# Patient Record
Sex: MALE | Race: BLACK OR AFRICAN AMERICAN | NOT HISPANIC OR LATINO | RURAL
[De-identification: names, ages, dates, MRNs, and addresses within clinical notes are randomized per-mention and may not be internally consistent; named-entity substitution may affect disease eponyms.]

---

## 2019-04-04 ENCOUNTER — HISTORICAL (OUTPATIENT)
Dept: ADMINISTRATIVE | Facility: HOSPITAL | Age: 79
End: 2019-04-04

## 2019-04-09 LAB
LAB AP CLINICAL INFORMATION: NORMAL
LAB AP COMMENTS: NORMAL
LAB AP DIAGNOSIS - HISTORICAL: NORMAL
LAB AP GROSS PATHOLOGY - HISTORICAL: NORMAL
LAB AP SPECIMEN SUBMITTED - HISTORICAL: NORMAL

## 2021-07-12 DIAGNOSIS — I10 ESSENTIAL HYPERTENSION: Primary | ICD-10-CM

## 2021-07-12 DIAGNOSIS — K21.9 GASTROESOPHAGEAL REFLUX DISEASE, UNSPECIFIED WHETHER ESOPHAGITIS PRESENT: ICD-10-CM

## 2021-07-12 RX ORDER — LOSARTAN POTASSIUM 50 MG/1
50 TABLET ORAL DAILY
Qty: 30 TABLET | Refills: 3 | Status: SHIPPED | OUTPATIENT
Start: 2021-07-12 | End: 2022-07-28

## 2021-07-12 RX ORDER — METOPROLOL SUCCINATE 100 MG/1
100 TABLET, EXTENDED RELEASE ORAL DAILY
Qty: 30 TABLET | Refills: 3 | Status: SHIPPED | OUTPATIENT
Start: 2021-07-12 | End: 2022-11-21 | Stop reason: SDUPTHER

## 2021-07-12 RX ORDER — METOPROLOL SUCCINATE 100 MG/1
TABLET, EXTENDED RELEASE ORAL
COMMUNITY
Start: 2021-05-14 | End: 2021-07-12 | Stop reason: SDUPTHER

## 2021-07-12 RX ORDER — GABAPENTIN 100 MG/1
100 CAPSULE ORAL 2 TIMES DAILY
COMMUNITY
Start: 2021-05-14 | End: 2022-07-28 | Stop reason: SDUPTHER

## 2021-07-12 RX ORDER — LOSARTAN POTASSIUM 50 MG/1
TABLET ORAL
COMMUNITY
Start: 2021-04-29 | End: 2021-07-12 | Stop reason: SDUPTHER

## 2021-07-12 RX ORDER — NIFEDIPINE 90 MG/1
TABLET, EXTENDED RELEASE ORAL
COMMUNITY
Start: 2021-05-14 | End: 2021-07-12 | Stop reason: SDUPTHER

## 2021-07-12 RX ORDER — NIFEDIPINE 90 MG/1
90 TABLET, EXTENDED RELEASE ORAL DAILY
Qty: 30 TABLET | Refills: 3 | Status: SHIPPED | OUTPATIENT
Start: 2021-07-12 | End: 2022-07-28

## 2021-07-12 RX ORDER — FAMOTIDINE 40 MG/1
TABLET, FILM COATED ORAL
COMMUNITY
Start: 2021-07-09 | End: 2021-07-12 | Stop reason: SDUPTHER

## 2021-07-12 RX ORDER — FAMOTIDINE 40 MG/1
40 TABLET, FILM COATED ORAL DAILY
Qty: 30 TABLET | Refills: 3 | Status: SHIPPED | OUTPATIENT
Start: 2021-07-12 | End: 2021-08-16 | Stop reason: SDUPTHER

## 2021-08-16 ENCOUNTER — OFFICE VISIT (OUTPATIENT)
Dept: PRIMARY CARE CLINIC | Facility: CLINIC | Age: 81
End: 2021-08-16
Payer: MEDICARE

## 2021-08-16 VITALS
DIASTOLIC BLOOD PRESSURE: 80 MMHG | HEART RATE: 76 BPM | HEIGHT: 74 IN | WEIGHT: 220 LBS | BODY MASS INDEX: 28.23 KG/M2 | OXYGEN SATURATION: 98 % | SYSTOLIC BLOOD PRESSURE: 146 MMHG | TEMPERATURE: 98 F | RESPIRATION RATE: 20 BRPM

## 2021-08-16 DIAGNOSIS — K40.21 BILATERAL RECURRENT INGUINAL HERNIA WITHOUT OBSTRUCTION OR GANGRENE: ICD-10-CM

## 2021-08-16 DIAGNOSIS — K21.9 GASTROESOPHAGEAL REFLUX DISEASE, UNSPECIFIED WHETHER ESOPHAGITIS PRESENT: ICD-10-CM

## 2021-08-16 DIAGNOSIS — M19.90 ARTHRITIS: Primary | ICD-10-CM

## 2021-08-16 PROCEDURE — 99213 PR OFFICE/OUTPT VISIT, EST, LEVL III, 20-29 MIN: ICD-10-PCS | Mod: ,,, | Performed by: FAMILY MEDICINE

## 2021-08-16 PROCEDURE — 99213 OFFICE O/P EST LOW 20 MIN: CPT | Mod: ,,, | Performed by: FAMILY MEDICINE

## 2021-08-16 RX ORDER — FAMOTIDINE 40 MG/1
40 TABLET, FILM COATED ORAL DAILY
Qty: 30 TABLET | Refills: 3 | Status: SHIPPED | OUTPATIENT
Start: 2021-08-16 | End: 2022-07-28

## 2021-08-16 RX ORDER — HYDROCODONE BITARTRATE AND ACETAMINOPHEN 5; 325 MG/1; MG/1
TABLET ORAL
COMMUNITY
Start: 2021-08-10 | End: 2022-11-21 | Stop reason: ALTCHOICE

## 2021-08-16 RX ORDER — LEVOFLOXACIN 750 MG/1
750 TABLET ORAL DAILY
COMMUNITY
Start: 2021-08-10 | End: 2022-11-21 | Stop reason: ALTCHOICE

## 2021-08-17 ENCOUNTER — OFFICE VISIT (OUTPATIENT)
Dept: PRIMARY CARE CLINIC | Facility: CLINIC | Age: 81
End: 2021-08-17
Payer: MEDICARE

## 2021-08-17 VITALS
SYSTOLIC BLOOD PRESSURE: 130 MMHG | TEMPERATURE: 98 F | HEART RATE: 87 BPM | BODY MASS INDEX: 28.23 KG/M2 | OXYGEN SATURATION: 97 % | WEIGHT: 220 LBS | HEIGHT: 74 IN | DIASTOLIC BLOOD PRESSURE: 80 MMHG

## 2021-08-17 DIAGNOSIS — C61 PROSTATE CANCER: ICD-10-CM

## 2021-08-17 DIAGNOSIS — K40.21 BILATERAL RECURRENT INGUINAL HERNIA WITHOUT OBSTRUCTION OR GANGRENE: Primary | ICD-10-CM

## 2021-08-17 PROCEDURE — 99212 PR OFFICE/OUTPT VISIT, EST, LEVL II, 10-19 MIN: ICD-10-PCS | Mod: ,,, | Performed by: FAMILY MEDICINE

## 2021-08-17 PROCEDURE — 99212 OFFICE O/P EST SF 10 MIN: CPT | Mod: ,,, | Performed by: FAMILY MEDICINE

## 2021-08-23 ENCOUNTER — TELEPHONE (OUTPATIENT)
Dept: PRIMARY CARE CLINIC | Facility: CLINIC | Age: 81
End: 2021-08-23

## 2021-08-23 DIAGNOSIS — K40.21 BILATERAL RECURRENT INGUINAL HERNIA WITHOUT OBSTRUCTION OR GANGRENE: Primary | ICD-10-CM

## 2021-10-11 ENCOUNTER — TELEPHONE (OUTPATIENT)
Dept: PRIMARY CARE CLINIC | Facility: CLINIC | Age: 81
End: 2021-10-11

## 2022-01-19 ENCOUNTER — TELEPHONE (OUTPATIENT)
Dept: PRIMARY CARE CLINIC | Facility: CLINIC | Age: 82
End: 2022-01-19
Payer: MEDICARE

## 2022-01-19 RX ORDER — ISOSORBIDE MONONITRATE 60 MG/1
1 TABLET, EXTENDED RELEASE ORAL DAILY
COMMUNITY
Start: 2022-01-18 | End: 2022-07-28 | Stop reason: SDUPTHER

## 2022-01-19 RX ORDER — ASPIRIN 81 MG/1
1 TABLET ORAL DAILY
COMMUNITY
Start: 2021-10-18

## 2022-01-19 RX ORDER — CLOPIDOGREL BISULFATE 75 MG/1
1 TABLET ORAL DAILY
COMMUNITY
Start: 2022-01-07 | End: 2022-07-28 | Stop reason: SDUPTHER

## 2022-01-19 RX ORDER — ROSUVASTATIN CALCIUM 20 MG/1
1 TABLET, COATED ORAL DAILY
COMMUNITY
Start: 2021-12-16 | End: 2023-02-23 | Stop reason: SDUPTHER

## 2022-01-19 NOTE — TELEPHONE ENCOUNTER
Spoke with pt's s/o Daniella for [facility] call. She reports pt is doing good. She denies pt has any further chest pain. Pt is current with HH. She denies any DME needs. She vu of all f/u appts. She denies any needs. Instructed to call HH or pt's dr for changes in his condition. Medications reviewed. She vu of meds. Instructed to take meds to all dr appts.

## 2022-02-08 ENCOUNTER — OFFICE VISIT (OUTPATIENT)
Dept: PRIMARY CARE CLINIC | Facility: CLINIC | Age: 82
End: 2022-02-08
Payer: MEDICARE

## 2022-02-08 VITALS
DIASTOLIC BLOOD PRESSURE: 72 MMHG | BODY MASS INDEX: 27.46 KG/M2 | RESPIRATION RATE: 20 BRPM | TEMPERATURE: 97 F | WEIGHT: 214 LBS | HEART RATE: 60 BPM | SYSTOLIC BLOOD PRESSURE: 140 MMHG | OXYGEN SATURATION: 96 % | HEIGHT: 74 IN

## 2022-02-08 DIAGNOSIS — I25.10 CORONARY ARTERY DISEASE INVOLVING NATIVE CORONARY ARTERY OF NATIVE HEART, UNSPECIFIED WHETHER ANGINA PRESENT: ICD-10-CM

## 2022-02-08 DIAGNOSIS — M19.90 ARTHRITIS: Primary | ICD-10-CM

## 2022-02-08 DIAGNOSIS — C61 PROSTATE CANCER: ICD-10-CM

## 2022-02-08 DIAGNOSIS — M54.16 LUMBAR RADICULITIS: Primary | ICD-10-CM

## 2022-02-08 PROBLEM — R07.9 CHEST PAIN: Status: ACTIVE | Noted: 2022-02-08

## 2022-02-08 PROBLEM — I10 HYPERTENSION: Status: ACTIVE | Noted: 2022-02-08

## 2022-02-08 PROBLEM — R97.20 RAISED PROSTATE SPECIFIC ANTIGEN: Status: ACTIVE | Noted: 2017-05-25

## 2022-02-08 PROBLEM — E66.9 OBESITY: Status: ACTIVE | Noted: 2022-02-08

## 2022-02-08 PROBLEM — Z85.46 PERSONAL HISTORY OF PROSTATE CANCER: Status: ACTIVE | Noted: 2022-02-08

## 2022-02-08 LAB
ALBUMIN SERPL BCP-MCNC: 4 G/DL (ref 3.5–5)
ALBUMIN/GLOB SERPL: 1 {RATIO}
ALP SERPL-CCNC: 77 U/L (ref 45–115)
ALT SERPL W P-5'-P-CCNC: 32 U/L (ref 16–61)
ANION GAP SERPL CALCULATED.3IONS-SCNC: 5 MMOL/L (ref 7–16)
AST SERPL W P-5'-P-CCNC: 26 U/L (ref 15–37)
BASOPHILS # BLD AUTO: 0.04 K/UL (ref 0–0.2)
BASOPHILS NFR BLD AUTO: 0.5 % (ref 0–1)
BILIRUB SERPL-MCNC: 0.3 MG/DL (ref 0–1.2)
BUN SERPL-MCNC: 14 MG/DL (ref 7–18)
BUN/CREAT SERPL: 14 (ref 6–20)
CALCIUM SERPL-MCNC: 9.7 MG/DL (ref 8.5–10.1)
CHLORIDE SERPL-SCNC: 105 MMOL/L (ref 98–107)
CHOLEST SERPL-MCNC: 99 MG/DL (ref 0–200)
CHOLEST/HDLC SERPL: 2.5 {RATIO}
CO2 SERPL-SCNC: 32 MMOL/L (ref 21–32)
CREAT SERPL-MCNC: 1.01 MG/DL (ref 0.7–1.3)
DIFFERENTIAL METHOD BLD: ABNORMAL
EOSINOPHIL # BLD AUTO: 0.47 K/UL (ref 0–0.5)
EOSINOPHIL NFR BLD AUTO: 6.2 % (ref 1–4)
ERYTHROCYTE [DISTWIDTH] IN BLOOD BY AUTOMATED COUNT: 13.4 % (ref 11.5–14.5)
GLOBULIN SER-MCNC: 4.1 G/DL (ref 2–4)
GLUCOSE SERPL-MCNC: 111 MG/DL (ref 74–106)
HCT VFR BLD AUTO: 41.4 % (ref 40–54)
HDLC SERPL-MCNC: 40 MG/DL (ref 40–60)
HGB BLD-MCNC: 13.2 G/DL (ref 13.5–18)
IMM GRANULOCYTES # BLD AUTO: 0.05 K/UL (ref 0–0.04)
IMM GRANULOCYTES NFR BLD: 0.7 % (ref 0–0.4)
LDLC SERPL CALC-MCNC: 44 MG/DL
LDLC/HDLC SERPL: 1.1 {RATIO}
LYMPHOCYTES # BLD AUTO: 2.58 K/UL (ref 1–4.8)
LYMPHOCYTES NFR BLD AUTO: 34.1 % (ref 27–41)
MCH RBC QN AUTO: 28.1 PG (ref 27–31)
MCHC RBC AUTO-ENTMCNC: 31.9 G/DL (ref 32–36)
MCV RBC AUTO: 88.3 FL (ref 80–96)
MONOCYTES # BLD AUTO: 0.81 K/UL (ref 0–0.8)
MONOCYTES NFR BLD AUTO: 10.7 % (ref 2–6)
MPC BLD CALC-MCNC: 10.8 FL (ref 9.4–12.4)
NEUTROPHILS # BLD AUTO: 3.61 K/UL (ref 1.8–7.7)
NEUTROPHILS NFR BLD AUTO: 47.8 % (ref 53–65)
NONHDLC SERPL-MCNC: 59 MG/DL
NRBC # BLD AUTO: 0 X10E3/UL
NRBC, AUTO (.00): 0 %
PLATELET # BLD AUTO: 213 K/UL (ref 150–400)
POTASSIUM SERPL-SCNC: 4.5 MMOL/L (ref 3.5–5.1)
PROT SERPL-MCNC: 8.1 G/DL (ref 6.4–8.2)
RBC # BLD AUTO: 4.69 M/UL (ref 4.6–6.2)
SODIUM SERPL-SCNC: 137 MMOL/L (ref 136–145)
TRIGL SERPL-MCNC: 73 MG/DL (ref 35–150)
VLDLC SERPL-MCNC: 15 MG/DL
WBC # BLD AUTO: 7.56 K/UL (ref 4.5–11)

## 2022-02-08 PROCEDURE — 99213 OFFICE O/P EST LOW 20 MIN: CPT | Mod: ,,, | Performed by: FAMILY MEDICINE

## 2022-02-08 PROCEDURE — 80061 LIPID PANEL: ICD-10-PCS | Mod: ,,, | Performed by: CLINICAL MEDICAL LABORATORY

## 2022-02-08 PROCEDURE — 80053 COMPREHEN METABOLIC PANEL: CPT | Mod: ,,, | Performed by: CLINICAL MEDICAL LABORATORY

## 2022-02-08 PROCEDURE — 99213 PR OFFICE/OUTPT VISIT, EST, LEVL III, 20-29 MIN: ICD-10-PCS | Mod: ,,, | Performed by: FAMILY MEDICINE

## 2022-02-08 PROCEDURE — 85025 COMPLETE CBC W/AUTO DIFF WBC: CPT | Mod: ,,, | Performed by: CLINICAL MEDICAL LABORATORY

## 2022-02-08 PROCEDURE — 85025 CBC WITH DIFFERENTIAL: ICD-10-PCS | Mod: ,,, | Performed by: CLINICAL MEDICAL LABORATORY

## 2022-02-08 PROCEDURE — 80053 COMPREHENSIVE METABOLIC PANEL: ICD-10-PCS | Mod: ,,, | Performed by: CLINICAL MEDICAL LABORATORY

## 2022-02-08 PROCEDURE — 80061 LIPID PANEL: CPT | Mod: ,,, | Performed by: CLINICAL MEDICAL LABORATORY

## 2022-02-08 RX ORDER — LISINOPRIL 20 MG/1
TABLET ORAL
COMMUNITY
Start: 2021-12-31 | End: 2022-07-28 | Stop reason: DRUGHIGH

## 2022-02-08 RX ORDER — IBUPROFEN 600 MG/1
TABLET ORAL
COMMUNITY
Start: 2021-08-18 | End: 2022-11-21 | Stop reason: ALTCHOICE

## 2022-02-08 RX ORDER — ATORVASTATIN CALCIUM 40 MG/1
TABLET, FILM COATED ORAL
COMMUNITY
Start: 2021-12-08 | End: 2022-07-28 | Stop reason: SDUPTHER

## 2022-02-08 RX ORDER — TRAMADOL HYDROCHLORIDE 50 MG/1
50 TABLET ORAL EVERY 12 HOURS PRN
COMMUNITY
End: 2023-02-23 | Stop reason: SDUPTHER

## 2022-02-08 NOTE — PROGRESS NOTES
Subjective:       Patient ID: Jan Perez is a 81 y.o. male.    Chief Complaint: Annual Exam and Low-back Pain      Pt. Is going to a pain clinic. He has been prescribed some PT.  He is to take the order to the hospital.    Low-back Pain  Associated symptoms include arthralgias. Pertinent negatives include no chest pain, coughing, fatigue, fever, headaches, myalgias, nausea or vomiting.     Review of Systems   Constitutional: Negative for fatigue and fever.   HENT: Negative for dental problem.    Eyes: Negative for discharge.   Respiratory: Negative for cough, choking, chest tightness and shortness of breath.    Cardiovascular: Negative for chest pain and leg swelling.   Gastrointestinal: Negative for constipation, diarrhea, nausea and vomiting.   Genitourinary: Negative for discharge and flank pain.   Musculoskeletal: Positive for arthralgias and back pain. Negative for myalgias.   Allergic/Immunologic: Negative for environmental allergies.   Neurological: Negative for headaches and memory loss.   Psychiatric/Behavioral: Negative for behavioral problems and hallucinations.         Objective:      Physical Exam  Vitals and nursing note reviewed.   Constitutional:       Appearance: Normal appearance. He is normal weight.   HENT:      Head: Normocephalic and atraumatic.      Right Ear: Tympanic membrane normal.      Left Ear: Tympanic membrane normal.      Nose: Nose normal.      Mouth/Throat:      Mouth: Mucous membranes are moist.   Eyes:      Extraocular Movements: Extraocular movements intact.      Conjunctiva/sclera: Conjunctivae normal.      Pupils: Pupils are equal, round, and reactive to light.   Cardiovascular:      Rate and Rhythm: Normal rate and regular rhythm.      Pulses: Normal pulses.   Pulmonary:      Effort: Pulmonary effort is normal.      Breath sounds: Normal breath sounds.   Abdominal:      General: Abdomen is flat. Bowel sounds are normal.      Palpations: Abdomen is soft.   Musculoskeletal:          General: Normal range of motion.      Cervical back: Normal range of motion and neck supple.      Comments: Multiple site arthritis; low back pain   Skin:     General: Skin is warm and dry.   Neurological:      General: No focal deficit present.      Mental Status: He is alert and oriented to person, place, and time.   Psychiatric:         Mood and Affect: Mood normal.         Assessment:       There are no diagnoses linked to this encounter.

## 2022-02-09 ENCOUNTER — TELEPHONE (OUTPATIENT)
Dept: PRIMARY CARE CLINIC | Facility: CLINIC | Age: 82
End: 2022-02-09
Payer: MEDICARE

## 2022-02-09 NOTE — TELEPHONE ENCOUNTER
----- Message from Mariaelena Marie MD sent at 2/9/2022  8:57 AM CST -----  Please give pt. results

## 2022-02-23 ENCOUNTER — CLINICAL SUPPORT (OUTPATIENT)
Dept: REHABILITATION | Facility: HOSPITAL | Age: 82
End: 2022-02-23
Payer: MEDICARE

## 2022-02-23 DIAGNOSIS — R52 PAIN: ICD-10-CM

## 2022-02-23 DIAGNOSIS — M54.16 LUMBAR RADICULITIS: ICD-10-CM

## 2022-02-23 PROCEDURE — 97110 THERAPEUTIC EXERCISES: CPT

## 2022-02-23 PROCEDURE — 97140 MANUAL THERAPY 1/> REGIONS: CPT

## 2022-02-23 PROCEDURE — 97161 PT EVAL LOW COMPLEX 20 MIN: CPT

## 2022-02-23 NOTE — PLAN OF CARE
[facility]   Physical Therapy Initial Evaluation    Name: Jan Perez  Clinic Number: [id number]    Therapy Diagnosis:   Encounter Diagnosis   Name Primary?    Lumbar radiculitis      Physician: Mariaelena Richardson FNP    Physician Orders: PT Eval and Treat   Medical Diagnosis from Referral: lumbar radiculitis  Evaluation Date: 2/23/2022  Authorization Period Expiration: 2/23/23  Plan of Care Expiration: 3/23/22  Visit # / Visits authorized: 1/ 6    Time In: 12:00  Time Out: 12:53  Total Appointment Time (timed & untimed codes): 53 minutes    Precautions: pacemaker and cancer    Subjective   Date of onset: 6 years ago  History of current condition - Jan reports: Pt with complaints of B LE pain that shoots down the legs. Pt states that in the evening pain starts and will come and go. Pt states that he is more sedentary with occasional walking around house and yard. Pt had tree fall on him in the 80's. Pt reports that pain started about 6 years ago and been going to MD for about 3 years. Pt has had epidural and had some relief. Pt is unsure of reason for PT referral. Pt returns to [facility] Monday. Pt states no MRI or CT scan but has had x-rays. Pt referred for conservative care.      Medical History:   Past Medical History:   Diagnosis Date    Hypertension        Surgical History:   Jan Perez  has no past surgical history on file.    Medications:   Jan has a current medication list which includes the following prescription(s): aspirin, atorvastatin, clopidogrel, famotidine, gabapentin, hydrocodone-acetaminophen, ibuprofen, isosorbide mononitrate, levofloxacin, lisinopril, losartan, metoprolol succinate, nifedipine, rosuvastatin, and tramadol.    Allergies:   Review of patient's allergies indicates:  No Known Allergies      Imaging, x-ray: pt unsure of results    Prior Therapy: None  Social History:  lives with daughter   Occupation: disability from tree falling on him in the 80's   Prior Level of  "Function: Independent  Current Level of Function: Independent     Pain:  Current 0/10, worst 9/10, best 0/10   Location: bilateral back  and upper legs  Description: Sharp and Shooting  Aggravating Factors: evening   Easing Factors: nothing that pt can recall    Pts goals: "less pain"     Objective     Posture:  1. Standing lordosis: Increased  2. Sitting lordosis: Increased  3. Iliac crest height: left increased  4. PSIS height: left increased  5. Pelvic rotation/torsion: no  6. Comments: L pelvic upslip    Forward bend: limited 50% with pain on L side  Back bend: limited 75% with pain on L side  Lateral trunk lean: limited 75% with pain on L side  Trunk rotation: limited 75% with pain on L side  90-90 Hamstring Test: B LE 45 degrees from straight    MANUAL MUSCLE TEST  Right left   Hip flexion MMT number: 4-/5 MMT strength: 4-/5   Hip abduction MMT strength: 4-/5 MMT strength: 4-/5   Knee extension MMT strength: 4/5 MMT strength: 4/5   Knee flexion  MMT strength: 4/5 MMT strength: 4/5       Special test Right  Left    SLR test < 60 degrees Negative Negative   SLR test > 60 degrees Positive Positive   Sitting slump test Positive Positive   Piriformis test Negative Negative   GABY test Negative Positive   SI forward bend Negative Positive   SI distraction Negative Negative   SI compression Negative Negative     Palpation: pain in B quadratus lumborum, piriformis, glute min/med, and psoas; L > R    Dermatome: pt unable to understand directions for dermatome testing      TREATMENT   Treatment Time In: 12:14  Treatment Time Out: 12:53  Total Treatment time (time-based codes) separate from Evaluation: 39 minutes (10 minutes non-billable)    Jan received the treatments listed below:  THERAPEUTIC EXERCISES to develop strength, endurance, ROM, flexibility, posture and core stabilization for 15 minutes including see flowsheet below  MANUAL THERAPY TECHNIQUES including Joint mobilizations and Soft tissue Mobilization " "were applied to see below for 14 minutes.  hot pack for 10  minutes to low back.       Ther-Ex    Nustep    Wedge    Hamstring Stretch    Posterior Pelvic Tilts 20 x 3"   Lower Trunk Rotations 5 x 10"   Single Knee to Chest Stretch 5 x 10"   Piriformis Stretch    Figure 4 Stretch    Hip Adduction 20 x 3"    Hip Abduction               PT completed palpation assessment and noted L innominate upslip. Manual correction of pelvic malalignment completed with mod effort by PT. Patient reported no change in previous symptoms following correction.     Active neuromuscular releases of left piriformis completed by PT with mod effort to decrease tissue irritation, tightness, and pain. Patient reported unchanged in reported symptoms. PT noted minimally improved tissue irritability and tightness post manual tx.     Home Exercises and Patient Education Provided    Education provided:   - HEP, POC, eval findings    Written Home Exercises Provided: yes.  Exercises were reviewed and Jan was able to demonstrate them prior to the end of the session.  Jan demonstrated fair  understanding of the education provided.     See EMR under Patient Instructions for exercises provided 2/23/2022.    Assessment   Jan is a 81 y.o. male referred to outpatient Physical Therapy with a medical diagnosis of lumbar radiculitis. Pt presents with increased pain, decreased muscle strength, and radicular symptoms. Pt with difficulty following instructions resulting in increased time and effort as well as maximal cuing. Pt did not report change with manual therapy but did report decreased pain with standing post manuals. MHP used to decrease muscle tightness and pain. No adverse effects noted.     Pt prognosis is Fair.   Pt will benefit from skilled outpatient Physical Therapy to address the deficits stated above and in the chart below, provide pt/family education, and to maximize pt's level of independence.     Plan of care discussed with patient: " Yes  Pt's spiritual, cultural and educational needs considered and patient is agreeable to the plan of care and goals as stated below:     Anticipated Barriers for therapy: chronic pain, past injury    Goals:  1. Patient will report decrease in pain to 5/10 to improve quality of life  2. Patient will report decrease in radiating occurrences from 70% to 35% to allow patient the ability to perform activities of daily living   3. Patient will increase B lower extremity strength to 4+/5 to improve ambulation ability  4. Patient will increase B hamstring 90/90 by 10 degrees      Plan   Plan of care Certification: 2/23/2022 to 3/16/2022    Outpatient Physical Therapy 2 times weekly for 3 weeks to include the following interventions: Electrical Stimulation IFC/Biphasic, Gait Training, Manual Therapy, Moist Heat/ Ice, Neuromuscular Re-ed, Patient Education, Self Care, Therapeutic Activities, Therapeutic Exercise and Ultrasound.     Manda Perla, PT, DPT 2/23/2022       Physician Signature: _____________________________________________________________ Date: _______________

## 2022-03-02 ENCOUNTER — CLINICAL SUPPORT (OUTPATIENT)
Dept: REHABILITATION | Facility: HOSPITAL | Age: 82
End: 2022-03-02
Payer: MEDICARE

## 2022-03-02 DIAGNOSIS — R52 PAIN: Primary | ICD-10-CM

## 2022-03-02 PROCEDURE — 97110 THERAPEUTIC EXERCISES: CPT | Mod: CQ

## 2022-03-02 NOTE — PROGRESS NOTES
"  [facility]   Physical Therapy Treatment Note     Name: Jan Perez  [facility] Number: [id number]    Therapy Diagnosis:   Encounter Diagnosis   Name Primary?    Pain Yes     Physician: Mariaelena Richardson FNP    Visit Date: 3/2/2022    Physician Orders: PT Eval and Treat   Medical Diagnosis from Referral: lumbar radiculitis  Evaluation Date: 2/23/2022  Authorization Period Expiration: 2/23/23  Plan of Care Expiration: 3/23/22  Visit # / Visits authorized: 2/ 6     Time In: 13:54  Time Out: 14:59  Total Appointment Time (timed & untimed codes): 65 minutes     Precautions: pacemaker and cancer    PTA Visit #: 1/5       SUBJECTIVE     Pt reports: "I feel pretty good today." Patient stated that he saw his doctor yesterday; patient stated that his doctor said that he was doing alright.    Response to previous treatment: No adverse effects  Functional change: Too early in POC    Pain: 0/10  Location: N/A    OBJECTIVE     Objective Measures updated at progress report unless specified.     Treatment     aJn received the treatments listed below:      THERAPEUTIC EXERCISES to develop strength, endurance, ROM, flexibility, posture and core stabilization including see flowsheet below: Added therapeutic exercises as tolerated by patient           Ther-Ex     Nustep 8 minutes *   Wedge 2 minutes *   Hamstring Stretch 2 x 30" each *   Posterior Pelvic Tilts 30 x 3" *   Lower Trunk Rotations 5 x 10"   Single Knee to Chest Stretch 5 x 10"   Piriformis Stretch 2 x 20" *   Figure 4 Stretch 2 x 20" *   Hip Adduction 30 x 3" *   Hip Abduction  2 x 10 *                        Patient Education and Home Exercises     Home Exercises Provided and Patient Education Provided     Written Home Exercises Provided: Patient instructed to cont prior HEP. Exercises were reviewed and Jan was able to demonstrate them prior to the end of the session.  Jan demonstrated good  understanding of the education provided. See " EMR under Patient Instructions for exercises provided during therapy sessions      ASSESSMENT     Jan is a 81 y.o. male referred to outpatient Physical Therapy with a medical diagnosis of lumbar radiculitis. LPTA provided max visual, verbal, and tactile cues for correct form, hold times, and speed of movement with therapy tasks. Patient with difficulty following instructions throughout treatment, resulting with increased therapy time. Patient with no reports of increased pain while performing therapeutic exercises. LPTA assessed pelvic alignment, no malalignments noted. Patient reported minimal pain with left oblique palpation, but no muscle tightness noted. Patient stated that he had no pain or adverse effects after treatment session.     Jan Is progressing well towards his goals.   Pt prognosis is Fair.     Pt will continue to benefit from skilled outpatient physical therapy to address the deficits listed in the problem list box on initial evaluation, provide pt/family education and to maximize pt's level of independence in the home and community environment.     Pt's spiritual, cultural and educational needs considered and pt agreeable to plan of care and goals.     Anticipated Barriers for therapy: chronic pain, past injury     Goals:  1. Patient will report decrease in pain to 5/10 to improve quality of life  2. Patient will report decrease in radiating occurrences from 70% to 35% to allow patient the ability to perform activities of daily living   3. Patient will increase B lower extremity strength to 4+/5 to improve ambulation ability  4. Patient will increase B hamstring 90/90 by 10 degrees    PLAN     Continue POC per PT order to progress towards rehab goals as tolerated by patient.     Brii Mayfield, PTA

## 2022-03-08 ENCOUNTER — CLINICAL SUPPORT (OUTPATIENT)
Dept: REHABILITATION | Facility: HOSPITAL | Age: 82
End: 2022-03-08
Payer: MEDICARE

## 2022-03-08 DIAGNOSIS — R52 PAIN: Primary | ICD-10-CM

## 2022-03-08 PROCEDURE — 97110 THERAPEUTIC EXERCISES: CPT | Mod: CQ

## 2022-03-08 NOTE — PROGRESS NOTES
"  [facility]   Physical Therapy Treatment Note     Name: Jan Perez  [facility] Number: [id number]    Therapy Diagnosis:   Encounter Diagnosis   Name Primary?    Pain Yes     Physician: Mariaelena Richardson FNP    Visit Date: 3/8/2022    Physician Orders: PT Eval and Treat   Medical Diagnosis from Referral: lumbar radiculitis  Evaluation Date: 2/23/2022  Authorization Period Expiration: 2/23/23  Plan of Care Expiration: 3/23/22  Visit # / Visits authorized: 3/ 6     Time In: 8:55  Time Out: 9:54  Total Appointment Time (timed & untimed codes): 59 minutes     Precautions: pacemaker and cancer    PTA Visit #: 2/5       SUBJECTIVE     Pt reports: Patient stated that he had no pain upon arrival to treatment session.    Response to previous treatment: Patient stated that he was doing good after, but reported he was hurting that night.  Functional change: Too early in POC    Pain: 0/10  Location: N/A    OBJECTIVE     Objective Measures updated at progress report unless specified.     Treatment     Jan received the treatments listed below:      THERAPEUTIC EXERCISES to develop strength, endurance, ROM, flexibility, posture and core stabilization including see flowsheet below: increased reps and hold times as tolerated by patient           Ther-Ex     Nustep 10 minutes *   Wedge 2 minutes    Hamstring Stretch 3 x 30" each *   Posterior Pelvic Tilts 30 x 3"    Lower Trunk Rotations 5 x 10"   Single Knee to Chest Stretch 5 x 10"   Piriformis Stretch 3 x 30" *   Figure 4 Stretch 3 x 30" *   Hip Adduction 30 x 3"    Hip Abduction  2 x 10 red TB                        Patient Education and Home Exercises     Home Exercises Provided and Patient Education Provided     Written Home Exercises Provided: Patient instructed to cont prior HEP. Exercises were reviewed and Jan was able to demonstrate them prior to the end of the session.  Jan demonstrated good  understanding of the education provided. See " EMR under Patient Instructions for exercises provided during therapy sessions      ASSESSMENT     Jan is a 81 y.o. male referred to outpatient Physical Therapy with a medical diagnosis of lumbar radiculitis. LPTA provided max visual, verbal, and tactile cues for correct form, hold times, and speed of movement with therapy tasks. Patient with difficulty following instructions throughout treatment, resulting with increased therapy time. Patient reported that he had pain during calf stretch on the wedge. Patient stated that he had no pain or adverse effects after treatment session.    Jan Is progressing well towards his goals.   Pt prognosis is Fair.     Pt will continue to benefit from skilled outpatient physical therapy to address the deficits listed in the problem list box on initial evaluation, provide pt/family education and to maximize pt's level of independence in the home and community environment.     Pt's spiritual, cultural and educational needs considered and pt agreeable to plan of care and goals.     Anticipated Barriers for therapy: chronic pain, past injury     Goals:  1. Patient will report decrease in pain to 5/10 to improve quality of life  2. Patient will report decrease in radiating occurrences from 70% to 35% to allow patient the ability to perform activities of daily living   3. Patient will increase B lower extremity strength to 4+/5 to improve ambulation ability  4. Patient will increase B hamstring 90/90 by 10 degrees    PLAN     Continue POC per PT order to progress towards rehab goals as tolerated by patient.     Brii Mayfield, PTA

## 2022-03-11 DIAGNOSIS — Z71.89 COMPLEX CARE COORDINATION: ICD-10-CM

## 2022-03-16 ENCOUNTER — DOCUMENTATION ONLY (OUTPATIENT)
Dept: REHABILITATION | Facility: HOSPITAL | Age: 82
End: 2022-03-16
Payer: MEDICARE

## 2022-03-16 NOTE — PROGRESS NOTES
Outpatient Therapy Discharge Summary     Name: Jan Perez  [facility] Number: [id number]    Therapy Diagnosis: No diagnosis found.  Physician: No ref. provider found    Physician Orders: PT Eval and Treat   Medical Diagnosis from Referral: lumbar radiculitis  Evaluation Date: 2/23/2022    Date of Last visit: 3/8/2022  Total Visits Received: 3  Cancelled Visits: 2  No Show Visits: 1    Assessment    Goals: NOT MET  1. Patient will report decrease in pain to 5/10 to improve quality of life  2. Patient will report decrease in radiating occurrences from 70% to 35% to allow patient the ability to perform activities of daily living   3. Patient will increase B lower extremity strength to 4+/5 to improve ambulation ability  4. Patient will increase B hamstring 90/90 by 10 degrees    Discharge reason: Other:  Pt was not consistent with attending PT treatments.       Plan   This patient is discharged from Physical Therapy.    Manda Perla, PT, DPT 3/16/2022

## 2022-07-28 ENCOUNTER — OFFICE VISIT (OUTPATIENT)
Dept: PRIMARY CARE CLINIC | Facility: CLINIC | Age: 82
End: 2022-07-28
Payer: MEDICARE

## 2022-07-28 VITALS
OXYGEN SATURATION: 98 % | DIASTOLIC BLOOD PRESSURE: 72 MMHG | TEMPERATURE: 98 F | WEIGHT: 200 LBS | HEIGHT: 74 IN | SYSTOLIC BLOOD PRESSURE: 110 MMHG | BODY MASS INDEX: 25.67 KG/M2 | HEART RATE: 76 BPM

## 2022-07-28 DIAGNOSIS — I10 PRIMARY HYPERTENSION: Primary | ICD-10-CM

## 2022-07-28 DIAGNOSIS — I25.10 CORONARY ARTERY DISEASE INVOLVING NATIVE CORONARY ARTERY OF NATIVE HEART, UNSPECIFIED WHETHER ANGINA PRESENT: ICD-10-CM

## 2022-07-28 DIAGNOSIS — C61 PROSTATE CANCER: ICD-10-CM

## 2022-07-28 DIAGNOSIS — K40.20 BILATERAL INGUINAL HERNIA WITHOUT OBSTRUCTION OR GANGRENE, RECURRENCE NOT SPECIFIED: ICD-10-CM

## 2022-07-28 DIAGNOSIS — N50.3 EPIDIDYMAL CYST: ICD-10-CM

## 2022-07-28 PROCEDURE — 99213 OFFICE O/P EST LOW 20 MIN: CPT | Mod: ,,, | Performed by: FAMILY MEDICINE

## 2022-07-28 PROCEDURE — 99213 PR OFFICE/OUTPT VISIT, EST, LEVL III, 20-29 MIN: ICD-10-PCS | Mod: ,,, | Performed by: FAMILY MEDICINE

## 2022-07-28 RX ORDER — CLOPIDOGREL BISULFATE 75 MG/1
75 TABLET ORAL DAILY
Qty: 90 TABLET | Refills: 3 | Status: SHIPPED | OUTPATIENT
Start: 2022-07-28

## 2022-07-28 RX ORDER — GABAPENTIN 100 MG/1
100 CAPSULE ORAL 2 TIMES DAILY
Qty: 60 CAPSULE | Refills: 2 | Status: SHIPPED | OUTPATIENT
Start: 2022-07-28 | End: 2023-02-23 | Stop reason: SDUPTHER

## 2022-07-28 RX ORDER — LISINOPRIL 10 MG/1
10 TABLET ORAL DAILY
COMMUNITY
Start: 2022-07-28 | End: 2022-11-21 | Stop reason: ALTCHOICE

## 2022-07-28 RX ORDER — ATORVASTATIN CALCIUM 40 MG/1
40 TABLET, FILM COATED ORAL NIGHTLY
Qty: 90 TABLET | Refills: 3 | Status: SHIPPED | OUTPATIENT
Start: 2022-07-28 | End: 2022-11-21 | Stop reason: ALTCHOICE

## 2022-07-28 RX ORDER — ISOSORBIDE MONONITRATE 60 MG/1
60 TABLET, EXTENDED RELEASE ORAL DAILY
Qty: 90 TABLET | Refills: 3 | Status: SHIPPED | OUTPATIENT
Start: 2022-07-28

## 2022-07-28 NOTE — PROGRESS NOTES
Subjective:       Patient ID: Jan Perez is a 81 y.o. male.    Chief Complaint: Hypertension (Check up and blood work.), Hyperlipidemia, and Medication Refill    Here for refills and a referral to a general surgeon to have his hernias repaired.    Hypertension  Pertinent negatives include no chest pain, headaches or shortness of breath.   Hyperlipidemia  Pertinent negatives include no chest pain, myalgias or shortness of breath.   Medication Refill  Associated symptoms include arthralgias. Pertinent negatives include no chest pain, coughing, fatigue, fever, headaches, myalgias, nausea or vomiting.     Review of Systems   Constitutional: Negative for fatigue and fever.   HENT: Negative for dental problem.    Eyes: Negative for discharge.   Respiratory: Negative for cough, choking, chest tightness and shortness of breath.    Cardiovascular: Negative for chest pain and leg swelling.   Gastrointestinal: Negative for constipation, diarrhea, nausea and vomiting.   Genitourinary: Positive for scrotal swelling. Negative for discharge and flank pain.   Musculoskeletal: Positive for arthralgias. Negative for myalgias.   Allergic/Immunologic: Negative for environmental allergies.   Neurological: Negative for headaches and memory loss.   Psychiatric/Behavioral: Negative for behavioral problems and hallucinations.         Objective:      Physical Exam  Vitals and nursing note reviewed.   Constitutional:       Appearance: Normal appearance. He is normal weight.   HENT:      Head: Normocephalic and atraumatic.      Right Ear: Tympanic membrane normal.      Left Ear: Tympanic membrane normal.      Nose: Nose normal.      Mouth/Throat:      Mouth: Mucous membranes are moist.   Eyes:      Extraocular Movements: Extraocular movements intact.      Conjunctiva/sclera: Conjunctivae normal.      Pupils: Pupils are equal, round, and reactive to light.   Cardiovascular:      Rate and Rhythm: Normal rate and regular rhythm.      Pulses:  Normal pulses.   Pulmonary:      Effort: Pulmonary effort is normal.      Breath sounds: Normal breath sounds.   Abdominal:      General: Abdomen is flat. Bowel sounds are normal.      Palpations: Abdomen is soft.   Genitourinary:     Comments: Pt. Has bilateral inguinal hernias  Musculoskeletal:         General: Normal range of motion.      Cervical back: Normal range of motion and neck supple.      Comments: Multiple site arthritis   Skin:     General: Skin is warm and dry.   Neurological:      General: No focal deficit present.      Mental Status: He is alert and oriented to person, place, and time.   Psychiatric:         Mood and Affect: Mood normal.         Assessment:       Problem List Items Addressed This Visit        Cardiac/Vascular    Coronary artery disease involving native coronary artery of native heart    Relevant Medications    isosorbide mononitrate (IMDUR) 60 MG 24 hr tablet    clopidogreL (PLAVIX) 75 mg tablet    atorvastatin (LIPITOR) 40 MG tablet    Primary hypertension - Primary       Renal/    Epididymal cyst    Relevant Orders    Ambulatory referral/consult to General Surgery       Oncology    Prostate cancer    Relevant Medications    gabapentin (NEURONTIN) 100 MG capsule       GI    Bilateral inguinal hernia without obstruction or gangrene    Relevant Orders    Ambulatory referral/consult to General Surgery          Plan:       Surgical referral done  meds refilled  RTC as needed

## 2022-08-08 ENCOUNTER — OFFICE VISIT (OUTPATIENT)
Dept: SURGERY | Facility: CLINIC | Age: 82
End: 2022-08-08
Payer: MEDICARE

## 2022-08-08 DIAGNOSIS — K40.20 BILATERAL INGUINAL HERNIA WITHOUT OBSTRUCTION OR GANGRENE, RECURRENCE NOT SPECIFIED: ICD-10-CM

## 2022-08-08 DIAGNOSIS — L72.0 EPIDERMAL CYST: ICD-10-CM

## 2022-08-08 PROCEDURE — 99204 PR OFFICE/OUTPT VISIT, NEW, LEVL IV, 45-59 MIN: ICD-10-PCS | Mod: S$PBB,,, | Performed by: STUDENT IN AN ORGANIZED HEALTH CARE EDUCATION/TRAINING PROGRAM

## 2022-08-08 PROCEDURE — 99204 OFFICE O/P NEW MOD 45 MIN: CPT | Mod: S$PBB,,, | Performed by: STUDENT IN AN ORGANIZED HEALTH CARE EDUCATION/TRAINING PROGRAM

## 2022-08-08 PROCEDURE — 99213 OFFICE O/P EST LOW 20 MIN: CPT | Mod: PBBFAC | Performed by: STUDENT IN AN ORGANIZED HEALTH CARE EDUCATION/TRAINING PROGRAM

## 2022-08-08 NOTE — PROGRESS NOTES
History & Physical    SUBJECTIVE:     History of Present Illness:  81-year-old  male presents to clinic for evaluation for bilateral inguinal hernias and left arm cyst.  Patient states he has had bilateral inguinal hernias for longer than 20 years, but they have not been painful.  Over the past 2 years they have increased in pain and has discomfort whenever he gets up or moves around.  History of coronary artery disease with stents placed in March of 2022; on Plavix.  Denies any chest pain/shortness of breath, nausea/vomiting/diarrhea, fever/chills.  Also complains of a cyst on his left arm    Chief Complaint   Patient presents with    Hernia     Bilateral inguinal       Review of patient's allergies indicates:  No Known Allergies    Current Outpatient Medications   Medication Sig Dispense Refill    aspirin (ECOTRIN) 81 MG EC tablet Take 1 tablet by mouth Daily.      atorvastatin (LIPITOR) 40 MG tablet Take 1 tablet (40 mg total) by mouth every evening. 90 tablet 3    clopidogreL (PLAVIX) 75 mg tablet Take 1 tablet (75 mg total) by mouth Daily. 90 tablet 3    gabapentin (NEURONTIN) 100 MG capsule Take 1 capsule (100 mg total) by mouth 2 (two) times daily. 60 capsule 2    HYDROcodone-acetaminophen (NORCO) 5-325 mg per tablet TAKE 1 TABLET BY MOUTH 4 TIMES DAILY      ibuprofen (ADVIL,MOTRIN) 600 MG tablet TAKE 1 TABLET BY MOUTH 4 TIMES DAILY WITH FOOD OR MILK ...STOP TAKING ALL OTHER NSAIDS WHILE TAKING THIS MEDICATION      isosorbide mononitrate (IMDUR) 60 MG 24 hr tablet Take 1 tablet (60 mg total) by mouth Daily. 90 tablet 3    levoFLOXacin (LEVAQUIN) 750 MG tablet Take 750 mg by mouth once daily.      lisinopriL 10 MG tablet Take 10 mg by mouth once daily.      metoprolol succinate (TOPROL-XL) 100 MG 24 hr tablet Take 1 tablet (100 mg total) by mouth once daily. 30 tablet 3    rosuvastatin (CRESTOR) 20 MG tablet Take 1 tablet by mouth Daily.      traMADoL (ULTRAM) 50 mg tablet Take 50  mg by mouth every 12 (twelve) hours as needed for Pain.       No current facility-administered medications for this visit.       Past Medical History:   Diagnosis Date    Hypertension      No past surgical history on file.  Family History   Problem Relation Age of Onset    Cancer Mother      Social History     Tobacco Use    Smoking status: Former Smoker    Smokeless tobacco: Never Used   Substance Use Topics    Alcohol use: Not Currently    Drug use: Never        Review of Systems:  Review of Systems   Constitutional: Negative.  Negative for appetite change, chills, fever and unexpected weight change.   HENT: Negative.  Negative for trouble swallowing.    Eyes: Negative.    Respiratory: Negative.  Negative for chest tightness and shortness of breath.    Cardiovascular: Negative.  Negative for chest pain.   Gastrointestinal: Negative.  Negative for abdominal distention, abdominal pain, blood in stool and nausea.   Endocrine: Negative.    Genitourinary: Negative.  Negative for hematuria.   Musculoskeletal: Negative.  Negative for back pain and myalgias.   Skin: Negative.  Negative for color change.   Allergic/Immunologic: Negative.    Neurological: Negative.  Negative for dizziness, syncope, weakness and light-headedness.   Psychiatric/Behavioral: Negative.  Negative for agitation.       OBJECTIVE:     Vital Signs (Most Recent)              Physical Exam:  Physical Exam  Constitutional:       General: He is not in acute distress.     Appearance: Normal appearance.   HENT:      Head: Normocephalic.      Nose: Nose normal.   Eyes:      Pupils: Pupils are equal, round, and reactive to light.   Cardiovascular:      Rate and Rhythm: Normal rate.   Pulmonary:      Effort: Pulmonary effort is normal. No respiratory distress.   Abdominal:      Palpations: Abdomen is soft.      Tenderness: There is no abdominal tenderness. There is no guarding or rebound.   Genitourinary:         Comments: Bilateral reducible inguinal  hernias  Musculoskeletal:         General: Normal range of motion.        Arms:       Cervical back: Normal range of motion.   Skin:     General: Skin is warm.      Coloration: Skin is not jaundiced.      Comments: Left forearm 2 x 2 cm simple epidermal cyst   Neurological:      General: No focal deficit present.      Mental Status: He is alert and oriented to person, place, and time.      Cranial Nerves: No cranial nerve deficit.   Psychiatric:         Mood and Affect: Mood normal.           ASSESSMENT/PLAN:     Bilateral inguinal hernias, left forearm cyst    PLAN:Plan     Recommend robot assisted bilateral inguinal hernia repair with mesh.  Also recommend excision of left forearm cyst at the same time.    Patient will need to follow-up with cardiology for surgical clearance.     Risks and benefits explained with the patient including risks for infection, bleeding, injury to surrounding structures, hematoma/seroma formation with need for possible evacuation, possible open.  The patient verbalized understanding, agrees and wishes to proceed with surgery.

## 2022-08-09 DIAGNOSIS — Z12.5 ENCOUNTER FOR SCREENING FOR MALIGNANT NEOPLASM OF PROSTATE: ICD-10-CM

## 2022-08-09 DIAGNOSIS — I10 PRIMARY HYPERTENSION: Primary | ICD-10-CM

## 2022-08-09 DIAGNOSIS — C61 PROSTATE CANCER: ICD-10-CM

## 2022-10-09 DIAGNOSIS — Z71.89 COMPLEX CARE COORDINATION: ICD-10-CM

## 2022-11-21 ENCOUNTER — OFFICE VISIT (OUTPATIENT)
Dept: PRIMARY CARE CLINIC | Facility: CLINIC | Age: 82
End: 2022-11-21
Payer: MEDICARE

## 2022-11-21 VITALS
RESPIRATION RATE: 20 BRPM | DIASTOLIC BLOOD PRESSURE: 78 MMHG | OXYGEN SATURATION: 98 % | TEMPERATURE: 98 F | HEART RATE: 81 BPM | WEIGHT: 202 LBS | HEIGHT: 74 IN | BODY MASS INDEX: 25.93 KG/M2 | SYSTOLIC BLOOD PRESSURE: 124 MMHG

## 2022-11-21 DIAGNOSIS — M19.90 ARTHRITIS: Primary | ICD-10-CM

## 2022-11-21 DIAGNOSIS — I10 ESSENTIAL HYPERTENSION: ICD-10-CM

## 2022-11-21 PROCEDURE — 99213 OFFICE O/P EST LOW 20 MIN: CPT | Mod: ,,, | Performed by: FAMILY MEDICINE

## 2022-11-21 PROCEDURE — 99213 PR OFFICE/OUTPT VISIT, EST, LEVL III, 20-29 MIN: ICD-10-PCS | Mod: ,,, | Performed by: FAMILY MEDICINE

## 2022-11-21 RX ORDER — METHOCARBAMOL 500 MG/1
TABLET, FILM COATED ORAL
COMMUNITY
End: 2022-11-21 | Stop reason: ALTCHOICE

## 2022-11-21 RX ORDER — LOSARTAN POTASSIUM 50 MG/1
TABLET ORAL
COMMUNITY
End: 2022-11-21 | Stop reason: ALTCHOICE

## 2022-11-21 RX ORDER — PREDNISONE 20 MG/1
20 TABLET ORAL DAILY
COMMUNITY
Start: 2022-10-18 | End: 2022-11-21 | Stop reason: ALTCHOICE

## 2022-11-21 RX ORDER — METOPROLOL SUCCINATE 100 MG/1
100 TABLET, EXTENDED RELEASE ORAL DAILY
Qty: 90 TABLET | Refills: 3 | Status: SHIPPED | OUTPATIENT
Start: 2022-11-21

## 2022-11-21 RX ORDER — CETIRIZINE HYDROCHLORIDE 10 MG/1
10 TABLET ORAL DAILY
COMMUNITY
Start: 2022-10-18 | End: 2022-11-21 | Stop reason: ALTCHOICE

## 2022-11-21 RX ORDER — AMLODIPINE BESYLATE 5 MG/1
5 TABLET ORAL DAILY
COMMUNITY
Start: 2022-10-21 | End: 2022-11-21 | Stop reason: ALTCHOICE

## 2022-11-21 RX ORDER — FAMOTIDINE 20 MG/1
20 TABLET, FILM COATED ORAL 2 TIMES DAILY
COMMUNITY
Start: 2022-10-18 | End: 2022-11-21 | Stop reason: ALTCHOICE

## 2022-11-21 NOTE — PROGRESS NOTES
Subjective:       Patient ID: Jan Perez is a 82 y.o. male.    Chief Complaint: Hypertension, Hyperlipidemia, and Medication Refill    Doing well. No new complaints    Hypertension  Pertinent negatives include no chest pain, headaches or shortness of breath.   Hyperlipidemia  Pertinent negatives include no chest pain, myalgias or shortness of breath.   Medication Refill  Associated symptoms include arthralgias. Pertinent negatives include no chest pain, coughing, fatigue, fever, headaches, myalgias, nausea or vomiting.   Review of Systems   Constitutional:  Negative for fatigue and fever.   HENT:  Negative for dental problem.    Eyes:  Negative for discharge.   Respiratory:  Negative for cough, choking, chest tightness and shortness of breath.    Cardiovascular:  Negative for chest pain and leg swelling.   Gastrointestinal:  Negative for constipation, diarrhea, nausea and vomiting.   Genitourinary:  Negative for discharge and flank pain.   Musculoskeletal:  Positive for arthralgias. Negative for myalgias.   Allergic/Immunologic: Negative for environmental allergies.   Neurological:  Negative for headaches and memory loss.   Psychiatric/Behavioral:  Negative for behavioral problems and hallucinations.        Objective:      Physical Exam  Vitals and nursing note reviewed.   Constitutional:       Appearance: Normal appearance. He is normal weight.   HENT:      Head: Normocephalic and atraumatic.      Right Ear: Tympanic membrane normal.      Left Ear: Tympanic membrane normal.      Nose: Nose normal.      Mouth/Throat:      Mouth: Mucous membranes are moist.   Eyes:      Extraocular Movements: Extraocular movements intact.      Conjunctiva/sclera: Conjunctivae normal.      Pupils: Pupils are equal, round, and reactive to light.   Cardiovascular:      Rate and Rhythm: Normal rate and regular rhythm.      Pulses: Normal pulses.   Pulmonary:      Effort: Pulmonary effort is normal.      Breath sounds: Normal breath  sounds.   Abdominal:      General: Abdomen is flat. Bowel sounds are normal.      Palpations: Abdomen is soft.   Musculoskeletal:         General: Normal range of motion.      Cervical back: Normal range of motion and neck supple.      Comments: Multiple site arthritis   Skin:     General: Skin is warm and dry.   Neurological:      General: No focal deficit present.      Mental Status: He is alert and oriented to person, place, and time.   Psychiatric:         Mood and Affect: Mood normal.       Assessment:       Problem List Items Addressed This Visit          Cardiac/Vascular    Essential hypertension    Relevant Medications    metoprolol succinate (TOPROL-XL) 100 MG 24 hr tablet       Orthopedic    Arthritis - Primary         Plan:       RTC as needed

## 2023-02-23 ENCOUNTER — OFFICE VISIT (OUTPATIENT)
Dept: PRIMARY CARE CLINIC | Facility: CLINIC | Age: 83
End: 2023-02-23
Payer: MEDICARE

## 2023-02-23 VITALS
SYSTOLIC BLOOD PRESSURE: 138 MMHG | TEMPERATURE: 98 F | HEART RATE: 78 BPM | HEIGHT: 74 IN | BODY MASS INDEX: 26.44 KG/M2 | WEIGHT: 206 LBS | OXYGEN SATURATION: 99 % | DIASTOLIC BLOOD PRESSURE: 80 MMHG

## 2023-02-23 DIAGNOSIS — I25.10 CORONARY ARTERY DISEASE INVOLVING NATIVE CORONARY ARTERY OF NATIVE HEART, UNSPECIFIED WHETHER ANGINA PRESENT: ICD-10-CM

## 2023-02-23 DIAGNOSIS — M19.90 ARTHRITIS: Primary | ICD-10-CM

## 2023-02-23 DIAGNOSIS — I10 PRIMARY HYPERTENSION: ICD-10-CM

## 2023-02-23 DIAGNOSIS — C61 PROSTATE CANCER: ICD-10-CM

## 2023-02-23 LAB
ALBUMIN SERPL BCP-MCNC: 3.9 G/DL (ref 3.5–5)
ALBUMIN/GLOB SERPL: 1 {RATIO}
ALP SERPL-CCNC: 60 U/L (ref 45–115)
ALT SERPL W P-5'-P-CCNC: 27 U/L (ref 16–61)
ANION GAP SERPL CALCULATED.3IONS-SCNC: 8 MMOL/L (ref 7–16)
AST SERPL W P-5'-P-CCNC: 33 U/L (ref 15–37)
BASOPHILS # BLD AUTO: 0.02 K/UL (ref 0–0.2)
BASOPHILS NFR BLD AUTO: 0.3 % (ref 0–1)
BILIRUB SERPL-MCNC: 0.5 MG/DL (ref ?–1.2)
BUN SERPL-MCNC: 13 MG/DL (ref 7–18)
BUN/CREAT SERPL: 12 (ref 6–20)
CALCIUM SERPL-MCNC: 9.3 MG/DL (ref 8.5–10.1)
CHLORIDE SERPL-SCNC: 108 MMOL/L (ref 98–107)
CHOLEST SERPL-MCNC: 95 MG/DL (ref 0–200)
CHOLEST/HDLC SERPL: 2.1 {RATIO}
CO2 SERPL-SCNC: 27 MMOL/L (ref 21–32)
CREAT SERPL-MCNC: 1.13 MG/DL (ref 0.7–1.3)
DIFFERENTIAL METHOD BLD: ABNORMAL
EGFR (NO RACE VARIABLE) (RUSH/TITUS): 65 ML/MIN/1.73M²
EOSINOPHIL # BLD AUTO: 0.24 K/UL (ref 0–0.5)
EOSINOPHIL NFR BLD AUTO: 3.1 % (ref 1–4)
ERYTHROCYTE [DISTWIDTH] IN BLOOD BY AUTOMATED COUNT: 13.8 % (ref 11.5–14.5)
GLOBULIN SER-MCNC: 3.8 G/DL (ref 2–4)
GLUCOSE SERPL-MCNC: 127 MG/DL (ref 74–106)
HCT VFR BLD AUTO: 41.4 % (ref 40–54)
HDLC SERPL-MCNC: 45 MG/DL (ref 40–60)
HGB BLD-MCNC: 12.7 G/DL (ref 13.5–18)
IMM GRANULOCYTES # BLD AUTO: 0.02 K/UL (ref 0–0.04)
IMM GRANULOCYTES NFR BLD: 0.3 % (ref 0–0.4)
LDLC SERPL CALC-MCNC: 36 MG/DL
LYMPHOCYTES # BLD AUTO: 3 K/UL (ref 1–4.8)
LYMPHOCYTES NFR BLD AUTO: 38.6 % (ref 27–41)
MCH RBC QN AUTO: 27.7 PG (ref 27–31)
MCHC RBC AUTO-ENTMCNC: 30.7 G/DL (ref 32–36)
MCV RBC AUTO: 90.4 FL (ref 80–96)
MONOCYTES # BLD AUTO: 0.71 K/UL (ref 0–0.8)
MONOCYTES NFR BLD AUTO: 9.1 % (ref 2–6)
MPC BLD CALC-MCNC: 10.4 FL (ref 9.4–12.4)
NEUTROPHILS # BLD AUTO: 3.79 K/UL (ref 1.8–7.7)
NEUTROPHILS NFR BLD AUTO: 48.6 % (ref 53–65)
NONHDLC SERPL-MCNC: 50 MG/DL
NRBC # BLD AUTO: 0 X10E3/UL
NRBC, AUTO (.00): 0 %
PLATELET # BLD AUTO: 201 K/UL (ref 150–400)
POTASSIUM SERPL-SCNC: 4 MMOL/L (ref 3.5–5.1)
PROT SERPL-MCNC: 7.7 G/DL (ref 6.4–8.2)
RBC # BLD AUTO: 4.58 M/UL (ref 4.6–6.2)
SODIUM SERPL-SCNC: 139 MMOL/L (ref 136–145)
TRIGL SERPL-MCNC: 68 MG/DL (ref 35–150)
VLDLC SERPL-MCNC: 14 MG/DL
WBC # BLD AUTO: 7.78 K/UL (ref 4.5–11)

## 2023-02-23 PROCEDURE — 85025 COMPLETE CBC W/AUTO DIFF WBC: CPT | Mod: ,,, | Performed by: CLINICAL MEDICAL LABORATORY

## 2023-02-23 PROCEDURE — 80053 COMPREHEN METABOLIC PANEL: CPT | Mod: ,,, | Performed by: CLINICAL MEDICAL LABORATORY

## 2023-02-23 PROCEDURE — 99213 OFFICE O/P EST LOW 20 MIN: CPT | Mod: ,,, | Performed by: FAMILY MEDICINE

## 2023-02-23 PROCEDURE — 80061 LIPID PANEL: ICD-10-PCS | Mod: ,,, | Performed by: CLINICAL MEDICAL LABORATORY

## 2023-02-23 PROCEDURE — 85025 CBC WITH DIFFERENTIAL: ICD-10-PCS | Mod: ,,, | Performed by: CLINICAL MEDICAL LABORATORY

## 2023-02-23 PROCEDURE — 80053 COMPREHENSIVE METABOLIC PANEL: ICD-10-PCS | Mod: ,,, | Performed by: CLINICAL MEDICAL LABORATORY

## 2023-02-23 PROCEDURE — 99213 PR OFFICE/OUTPT VISIT, EST, LEVL III, 20-29 MIN: ICD-10-PCS | Mod: ,,, | Performed by: FAMILY MEDICINE

## 2023-02-23 PROCEDURE — 80061 LIPID PANEL: CPT | Mod: ,,, | Performed by: CLINICAL MEDICAL LABORATORY

## 2023-02-23 RX ORDER — GABAPENTIN 100 MG/1
100 CAPSULE ORAL 3 TIMES DAILY
Qty: 90 CAPSULE | Refills: 2 | Status: SHIPPED | OUTPATIENT
Start: 2023-02-23

## 2023-02-23 RX ORDER — ROSUVASTATIN CALCIUM 20 MG/1
20 TABLET, COATED ORAL DAILY
Qty: 90 TABLET | Refills: 3 | Status: SHIPPED | OUTPATIENT
Start: 2023-02-23

## 2023-02-23 RX ORDER — AMLODIPINE BESYLATE 10 MG/1
10 TABLET ORAL
COMMUNITY
Start: 2023-01-20

## 2023-02-23 RX ORDER — TRAMADOL HYDROCHLORIDE 50 MG/1
50 TABLET ORAL EVERY 12 HOURS PRN
Qty: 60 TABLET | Refills: 2 | Status: SHIPPED | OUTPATIENT
Start: 2023-02-23

## 2023-02-23 NOTE — PROGRESS NOTES
Subjective:       Patient ID: Jan Perez is a 82 y.o. male.    Chief Complaint: Hypertension (Check up and blood work.), Hyperlipidemia, and Medication Refill (Didn't bring medication today.)    Doing well. No complaints today. Needs refills.    Hypertension  Pertinent negatives include no chest pain, headaches or shortness of breath.   Hyperlipidemia  Pertinent negatives include no chest pain, myalgias or shortness of breath.   Medication Refill  Associated symptoms include arthralgias. Pertinent negatives include no chest pain, coughing, fatigue, fever, headaches, myalgias, nausea or vomiting.   Review of Systems   Constitutional:  Negative for fatigue and fever.   HENT:  Negative for dental problem.    Eyes:  Negative for discharge.   Respiratory:  Negative for cough, choking, chest tightness and shortness of breath.    Cardiovascular:  Negative for chest pain and leg swelling.   Gastrointestinal:  Negative for constipation, diarrhea, nausea and vomiting.   Genitourinary:  Negative for discharge and flank pain.   Musculoskeletal:  Positive for arthralgias. Negative for myalgias.   Allergic/Immunologic: Negative for environmental allergies.   Neurological:  Negative for headaches and memory loss.   Psychiatric/Behavioral:  Negative for behavioral problems and hallucinations.        Objective:      Physical Exam  Vitals and nursing note reviewed.   Constitutional:       Appearance: Normal appearance. He is normal weight.   HENT:      Head: Normocephalic and atraumatic.      Right Ear: Tympanic membrane normal.      Left Ear: Tympanic membrane normal.      Nose: Nose normal.      Mouth/Throat:      Mouth: Mucous membranes are moist.   Eyes:      Extraocular Movements: Extraocular movements intact.      Conjunctiva/sclera: Conjunctivae normal.      Pupils: Pupils are equal, round, and reactive to light.   Cardiovascular:      Rate and Rhythm: Normal rate and regular rhythm.      Pulses: Normal pulses.    Pulmonary:      Effort: Pulmonary effort is normal.      Breath sounds: Normal breath sounds.   Abdominal:      General: Abdomen is flat. Bowel sounds are normal.      Palpations: Abdomen is soft.   Musculoskeletal:         General: Normal range of motion.      Cervical back: Normal range of motion and neck supple.      Comments: Multiple site arthritis   Skin:     General: Skin is warm and dry.   Neurological:      General: No focal deficit present.      Mental Status: He is alert and oriented to person, place, and time.   Psychiatric:         Mood and Affect: Mood normal.       Assessment:       Problem List Items Addressed This Visit          Cardiac/Vascular    Coronary artery disease involving native coronary artery of native heart    Relevant Medications    rosuvastatin (CRESTOR) 20 MG tablet    Primary hypertension    Relevant Medications    rosuvastatin (CRESTOR) 20 MG tablet       Oncology    Prostate cancer    Relevant Medications    gabapentin (NEURONTIN) 100 MG capsule       Orthopedic    Arthritis - Primary    Relevant Medications    traMADoL (ULTRAM) 50 mg tablet         Plan:       RTC as needed

## 2023-03-14 ENCOUNTER — TELEPHONE (OUTPATIENT)
Dept: PRIMARY CARE CLINIC | Facility: CLINIC | Age: 83
End: 2023-03-14
Payer: MEDICARE

## 2023-03-14 NOTE — TELEPHONE ENCOUNTER
----- Message from Mariaelena Marie MD sent at 2/27/2023  8:22 AM CST -----  Please give pt. results

## 2023-05-09 DIAGNOSIS — Z71.89 COMPLEX CARE COORDINATION: ICD-10-CM

## 2023-05-23 ENCOUNTER — OFFICE VISIT (OUTPATIENT)
Dept: PRIMARY CARE CLINIC | Facility: CLINIC | Age: 83
End: 2023-05-23
Payer: MEDICARE

## 2023-05-23 VITALS
OXYGEN SATURATION: 99 % | BODY MASS INDEX: 26.44 KG/M2 | DIASTOLIC BLOOD PRESSURE: 82 MMHG | HEART RATE: 67 BPM | SYSTOLIC BLOOD PRESSURE: 130 MMHG | TEMPERATURE: 98 F | HEIGHT: 74 IN | WEIGHT: 206 LBS | RESPIRATION RATE: 18 BRPM

## 2023-05-23 DIAGNOSIS — M19.90 ARTHRITIS: ICD-10-CM

## 2023-05-23 DIAGNOSIS — I25.118 CORONARY ARTERY DISEASE OF NATIVE ARTERY OF NATIVE HEART WITH STABLE ANGINA PECTORIS: ICD-10-CM

## 2023-05-23 DIAGNOSIS — I10 PRIMARY HYPERTENSION: Primary | ICD-10-CM

## 2023-05-23 LAB
ALBUMIN SERPL BCP-MCNC: 3.8 G/DL (ref 3.5–5)
ALBUMIN/GLOB SERPL: 1.1 {RATIO}
ALP SERPL-CCNC: 68 U/L (ref 45–115)
ALT SERPL W P-5'-P-CCNC: 22 U/L (ref 16–61)
ANION GAP SERPL CALCULATED.3IONS-SCNC: 7 MMOL/L (ref 7–16)
AST SERPL W P-5'-P-CCNC: 30 U/L (ref 15–37)
BASOPHILS # BLD AUTO: 0.02 K/UL (ref 0–0.2)
BASOPHILS NFR BLD AUTO: 0.3 % (ref 0–1)
BILIRUB SERPL-MCNC: 0.5 MG/DL (ref ?–1.2)
BUN SERPL-MCNC: 14 MG/DL (ref 7–18)
BUN/CREAT SERPL: 15 (ref 6–20)
CALCIUM SERPL-MCNC: 9.4 MG/DL (ref 8.5–10.1)
CHLORIDE SERPL-SCNC: 106 MMOL/L (ref 98–107)
CO2 SERPL-SCNC: 30 MMOL/L (ref 21–32)
CREAT SERPL-MCNC: 0.95 MG/DL (ref 0.7–1.3)
DIFFERENTIAL METHOD BLD: ABNORMAL
EGFR (NO RACE VARIABLE) (RUSH/TITUS): 80 ML/MIN/1.73M2
EOSINOPHIL # BLD AUTO: 0.29 K/UL (ref 0–0.5)
EOSINOPHIL NFR BLD AUTO: 3.8 % (ref 1–4)
ERYTHROCYTE [DISTWIDTH] IN BLOOD BY AUTOMATED COUNT: 13.6 % (ref 11.5–14.5)
GLOBULIN SER-MCNC: 3.5 G/DL (ref 2–4)
GLUCOSE SERPL-MCNC: 132 MG/DL (ref 74–106)
HCT VFR BLD AUTO: 43.1 % (ref 40–54)
HGB BLD-MCNC: 13 G/DL (ref 13.5–18)
IMM GRANULOCYTES # BLD AUTO: 0.02 K/UL (ref 0–0.04)
IMM GRANULOCYTES NFR BLD: 0.3 % (ref 0–0.4)
LYMPHOCYTES # BLD AUTO: 2.21 K/UL (ref 1–4.8)
LYMPHOCYTES NFR BLD AUTO: 29.3 % (ref 27–41)
MCH RBC QN AUTO: 27.2 PG (ref 27–31)
MCHC RBC AUTO-ENTMCNC: 30.2 G/DL (ref 32–36)
MCV RBC AUTO: 90.2 FL (ref 80–96)
MONOCYTES # BLD AUTO: 0.58 K/UL (ref 0–0.8)
MONOCYTES NFR BLD AUTO: 7.7 % (ref 2–6)
MPC BLD CALC-MCNC: 10.9 FL (ref 9.4–12.4)
NEUTROPHILS # BLD AUTO: 4.42 K/UL (ref 1.8–7.7)
NEUTROPHILS NFR BLD AUTO: 58.6 % (ref 53–65)
NRBC # BLD AUTO: 0 X10E3/UL
NRBC, AUTO (.00): 0 %
PLATELET # BLD AUTO: 207 K/UL (ref 150–400)
POTASSIUM SERPL-SCNC: 3.5 MMOL/L (ref 3.5–5.1)
PROT SERPL-MCNC: 7.3 G/DL (ref 6.4–8.2)
RBC # BLD AUTO: 4.78 M/UL (ref 4.6–6.2)
SODIUM SERPL-SCNC: 139 MMOL/L (ref 136–145)
WBC # BLD AUTO: 7.54 K/UL (ref 4.5–11)

## 2023-05-23 PROCEDURE — 99213 PR OFFICE/OUTPT VISIT, EST, LEVL III, 20-29 MIN: ICD-10-PCS | Mod: ,,, | Performed by: FAMILY MEDICINE

## 2023-05-23 PROCEDURE — 85025 COMPLETE CBC W/AUTO DIFF WBC: CPT | Mod: ,,, | Performed by: CLINICAL MEDICAL LABORATORY

## 2023-05-23 PROCEDURE — 85025 CBC WITH DIFFERENTIAL: ICD-10-PCS | Mod: ,,, | Performed by: CLINICAL MEDICAL LABORATORY

## 2023-05-23 PROCEDURE — 80053 COMPREHENSIVE METABOLIC PANEL: ICD-10-PCS | Mod: ,,, | Performed by: CLINICAL MEDICAL LABORATORY

## 2023-05-23 PROCEDURE — 80053 COMPREHEN METABOLIC PANEL: CPT | Mod: ,,, | Performed by: CLINICAL MEDICAL LABORATORY

## 2023-05-23 PROCEDURE — 99213 OFFICE O/P EST LOW 20 MIN: CPT | Mod: ,,, | Performed by: FAMILY MEDICINE

## 2023-05-23 RX ORDER — NITROGLYCERIN 0.4 MG/1
TABLET SUBLINGUAL
COMMUNITY
Start: 2023-03-10

## 2023-05-23 RX ORDER — ASPIRIN 325 MG
325 TABLET ORAL DAILY
COMMUNITY

## 2023-05-23 RX ORDER — HYDROCHLOROTHIAZIDE 12.5 MG/1
12.5 TABLET ORAL
COMMUNITY
Start: 2023-04-10

## 2023-05-23 RX ORDER — LANSOPRAZOLE 30 MG/1
30 CAPSULE, DELAYED RELEASE ORAL 2 TIMES DAILY
COMMUNITY
Start: 2023-05-10

## 2023-05-23 RX ORDER — AMLODIPINE BESYLATE 2.5 MG/1
2.5 TABLET ORAL
COMMUNITY
Start: 2023-05-11

## 2023-05-23 NOTE — PROGRESS NOTES
Subjective     Patient ID: Jan Perez is a 82 y.o. male.    Chief Complaint: check up    Doing well. Voices no complaints    Review of Systems   Constitutional:  Negative for fatigue and fever.   HENT:  Negative for dental problem.    Eyes:  Negative for discharge.   Respiratory:  Negative for cough, choking, chest tightness and shortness of breath.    Cardiovascular:  Negative for chest pain and leg swelling.   Gastrointestinal:  Negative for constipation, diarrhea, nausea and vomiting.   Genitourinary:  Negative for discharge and flank pain.   Musculoskeletal:  Negative for arthralgias and myalgias.   Allergic/Immunologic: Negative for environmental allergies.   Neurological:  Negative for headaches and memory loss.   Psychiatric/Behavioral:  Negative for behavioral problems and hallucinations.         Objective     Physical Exam  Vitals and nursing note reviewed.   Constitutional:       Appearance: Normal appearance. He is normal weight.   HENT:      Head: Normocephalic and atraumatic.      Right Ear: Tympanic membrane normal.      Left Ear: Tympanic membrane normal.      Nose: Nose normal.      Mouth/Throat:      Mouth: Mucous membranes are moist.   Eyes:      Extraocular Movements: Extraocular movements intact.      Conjunctiva/sclera: Conjunctivae normal.      Pupils: Pupils are equal, round, and reactive to light.   Cardiovascular:      Rate and Rhythm: Normal rate and regular rhythm.      Pulses: Normal pulses.   Pulmonary:      Effort: Pulmonary effort is normal.      Breath sounds: Normal breath sounds.   Abdominal:      General: Abdomen is flat. Bowel sounds are normal.      Palpations: Abdomen is soft.   Musculoskeletal:         General: Normal range of motion.      Cervical back: Normal range of motion and neck supple.      Comments: Multiple site arthritis   Skin:     General: Skin is warm and dry.   Neurological:      General: No focal deficit present.      Mental Status: He is alert and oriented  to person, place, and time.   Psychiatric:         Mood and Affect: Mood normal.          Assessment and Plan     Problem List Items Addressed This Visit          Cardiac/Vascular    Coronary artery disease of native artery of native heart with stable angina pectoris    Primary hypertension - Primary    Relevant Orders    CBC Auto Differential    Comprehensive Metabolic Panel       Orthopedic    Arthritis       Continue meds  Will call lab results  RTC 4 months or before if needed

## 2023-07-05 ENCOUNTER — OFFICE VISIT (OUTPATIENT)
Dept: PRIMARY CARE CLINIC | Facility: CLINIC | Age: 83
End: 2023-07-05
Payer: MEDICARE

## 2023-07-05 VITALS
OXYGEN SATURATION: 98 % | HEART RATE: 64 BPM | RESPIRATION RATE: 18 BRPM | BODY MASS INDEX: 26.39 KG/M2 | SYSTOLIC BLOOD PRESSURE: 123 MMHG | HEIGHT: 74 IN | DIASTOLIC BLOOD PRESSURE: 69 MMHG | TEMPERATURE: 98 F | WEIGHT: 205.63 LBS

## 2023-07-05 DIAGNOSIS — I5A NONISCHEMIC NONTRAUMATIC MYOCARDIAL INJURY: ICD-10-CM

## 2023-07-05 DIAGNOSIS — I10 PRIMARY HYPERTENSION: Primary | ICD-10-CM

## 2023-07-05 DIAGNOSIS — I25.10 CORONARY ARTERY DISEASE, UNSPECIFIED VESSEL OR LESION TYPE, UNSPECIFIED WHETHER ANGINA PRESENT, UNSPECIFIED WHETHER NATIVE OR TRANSPLANTED HEART: ICD-10-CM

## 2023-07-05 DIAGNOSIS — I50.30 DIASTOLIC CONGESTIVE HEART FAILURE, UNSPECIFIED HF CHRONICITY: ICD-10-CM

## 2023-07-05 PROCEDURE — 99212 OFFICE O/P EST SF 10 MIN: CPT | Mod: ,,, | Performed by: NURSE PRACTITIONER

## 2023-07-05 PROCEDURE — 99212 PR OFFICE/OUTPT VISIT, EST, LEVL II, 10-19 MIN: ICD-10-PCS | Mod: ,,, | Performed by: NURSE PRACTITIONER

## 2023-07-05 RX ORDER — VALSARTAN 80 MG/1
80 TABLET ORAL
COMMUNITY
Start: 2023-07-03

## 2023-07-05 NOTE — PROGRESS NOTES
[facility]  Primary Care       PATIENT NAME: Jan Perez   : 1940    AGE: 82 y.o. DATE: 2023    MRN: [id number]        Reason for Visit / Chief Complaint:  Follow-up (PT WAS IN [facility]   -  HAD  ANGIO SEAL)     Subjective:     HPI: Patient here for follow up post hospitalization; recently hospitalized on 2023 at [facility] in [place], [place] for chest pain. Patient had left hay cath by Dr. Ku on 2023. During hospitalization, he was treated for nonischemic myocardial injury/CAD native artery without angina. Hospital records received from [facility]; note states patient had follow up appointment scheduled with his cardiologist, Edgar Sharpe NP on 7/3/2023    Patient denies any chest pain today.     Follow-up  Pertinent negatives include no chest pain, coughing, fever, headaches or rash.        Review of Systems: Review of Systems   Constitutional:  Negative for fever.   Respiratory:  Negative for cough and shortness of breath.    Cardiovascular:  Negative for chest pain.   Genitourinary:  Negative for dysuria.   Musculoskeletal:  Negative for gait problem.   Skin:  Negative for rash.   Neurological:  Negative for headaches.        Review of patient's allergies indicates:  No Known Allergies     Med List:  Current Outpatient Medications on File Prior to Visit   Medication Sig Dispense Refill    amLODIPine (NORVASC) 10 MG tablet Take 10 mg by mouth.      aspirin (ECOTRIN) 81 MG EC tablet Take 1 tablet by mouth Daily.      clopidogreL (PLAVIX) 75 mg tablet Take 1 tablet (75 mg total) by mouth Daily. 90 tablet 3    gabapentin (NEURONTIN) 100 MG capsule Take 1 capsule (100 mg total) by mouth 3 (three) times daily. 90 capsule 2    hydroCHLOROthiazide (HYDRODIURIL) 12.5 MG Tab Take 12.5 mg by mouth.      isosorbide mononitrate (IMDUR) 60 MG 24 hr tablet Take 1 tablet (60 mg total) by mouth Daily. 90 tablet 3    lansoprazole (PREVACID) 30 MG  "capsule Take 30 mg by mouth 2 (two) times daily.      metoprolol succinate (TOPROL-XL) 100 MG 24 hr tablet Take 1 tablet (100 mg total) by mouth once daily. 90 tablet 3    nitroGLYCERIN (NITROSTAT) 0.4 MG SL tablet Place under the tongue.      rosuvastatin (CRESTOR) 20 MG tablet Take 1 tablet (20 mg total) by mouth Daily. 90 tablet 3    traMADoL (ULTRAM) 50 mg tablet Take 1 tablet (50 mg total) by mouth every 12 (twelve) hours as needed for Pain. 60 tablet 2    valsartan (DIOVAN) 80 MG tablet Take 80 mg by mouth.      amLODIPine (NORVASC) 2.5 MG tablet Take 2.5 mg by mouth.      aspirin 325 MG tablet Take 325 mg by mouth once daily.       No current facility-administered medications on file prior to visit.       Medical/Social/Family History:  Past Medical History:   Diagnosis Date    Hypertension       Social History     Tobacco Use   Smoking Status Former   Smokeless Tobacco Never      Social History     Substance and Sexual Activity   Alcohol Use Not Currently       Family History   Problem Relation Age of Onset    Cancer Mother       History reviewed. No pertinent surgical history.     There is no immunization history on file for this patient.       Objective:      Vitals:    07/05/23 1012   BP: 123/69   BP Location: Right arm   Patient Position: Sitting   BP Method: Large (Automatic)   Pulse: 64   Resp: 18   Temp: 97.5 °F (36.4 °C)   TempSrc: Oral   SpO2: 98%   Weight: 93.3 kg (205 lb 9.6 oz)   Height: 6' 2" (1.88 m)     Body mass index is 26.4 kg/m².     Physical Exam: Physical Exam  Constitutional:       General: He is not in acute distress.     Appearance: Normal appearance. He is not ill-appearing, toxic-appearing or diaphoretic.   HENT:      Head: Normocephalic.      Mouth/Throat:      Mouth: Mucous membranes are moist.   Eyes:      Extraocular Movements: Extraocular movements intact.      Conjunctiva/sclera: Conjunctivae normal.      Pupils: Pupils are equal, round, and reactive to light. "   Cardiovascular:      Rate and Rhythm: Normal rate and regular rhythm.      Heart sounds: Normal heart sounds.   Pulmonary:      Effort: Pulmonary effort is normal. No respiratory distress.      Breath sounds: Normal breath sounds. No stridor. No wheezing, rhonchi or rales.   Musculoskeletal:         General: Normal range of motion.      Cervical back: Normal range of motion and neck supple.   Skin:     General: Skin is warm and dry.   Neurological:      General: No focal deficit present.      Mental Status: He is alert and oriented to person, place, and time.      Gait: Gait normal.   Psychiatric:         Mood and Affect: Mood normal.         Behavior: Behavior normal.              Assessment:          ICD-10-CM ICD-9-CM   1. Primary hypertension  I10 401.9   2. Diastolic congestive heart failure, unspecified HF chronicity  I50.30 428.30     428.0   3. Nonischemic nontraumatic myocardial injury  I5A 429.1   4. Coronary artery disease, unspecified vessel or lesion type, unspecified whether angina present, unspecified whether native or transplanted heart  I25.10 414.00        Plan:       Primary hypertension    Diastolic congestive heart failure, unspecified HF chronicity    Nonischemic nontraumatic myocardial injury    Coronary artery disease, unspecified vessel or lesion type, unspecified whether angina present, unspecified whether native or transplanted heart          New & refilled meds:  Requested Prescriptions      No prescriptions requested or ordered in this encounter       Follow up in about 1 month (around 8/5/2023), or if symptoms worsen or fail to improve, for follow up in one month with Dr. Marie for post MI. .     Patient Instructions   Patient Education       Controlling Your Blood Pressure Through Lifestyle   The Basics   Written by the doctors and editors at [facility]   What does my lifestyle have to do with my blood pressure? -- The things you do and the foods you eat have a big effect on your  "blood pressure and your overall health. Following the right lifestyle can:  Lower your blood pressure or keep you from getting high blood pressure in the first place  Reduce your need for blood pressure medicines  Make medicines for high blood pressure work better, if you do take them  Lower the chances that you'll have a heart attack or stroke, or develop kidney disease  Which lifestyle choices will help lower my blood pressure? -- Here's what you can do:  Lose weight (if you are overweight)  Choose a diet rich in fruits, vegetables, and low-fat dairy products, and low in meats, sweets, and refined grains  Eat less salt (sodium)  Do something active for at least 30 minutes a day on most days of the week  Limit the amount of alcohol you drink  If you have high blood pressure, it's also very important to quit smoking (if you smoke). Quitting smoking might not bring your blood pressure down. But it will lower the chances that you'll have a heart attack or stroke, and it will help you feel better and live longer.  Start low and go slow -- The changes listed above might sound like a lot, but don't worry. You don't have to change everything all at once. The key to improving your lifestyle is to "start low and go slow." Choose 1 small, specific thing to change and try doing it for a while. If it works for you, keep doing it until it becomes a habit. If it doesn't, don't give up. Choose something else to change and see how that goes.  Let's say, for example, that you would like to improve your diet. If you're the type of person who eats cheeseburgers and French fries all the time, you can't switch to eating just salads from one day to the next. When people try to make changes like that, they often fail. Then they feel frustrated and tend to give up. So instead of trying to change everything about your diet in 1 day, change 1 or 2 small things about your diet and give yourself time to get used to those changes. For " "instance, keep the cheeseburger but give up the French fries. Or eat the same things but cut your portions in half.  As you find things that you are able to change and stick with, keep adding new changes. In time, you will see that you can actually change a lot. You just have to get used to the changes slowly.  Lose weight -- When people think about losing weight, they sometimes make it more complicated than it really is. To lose weight, you have to either eat less or move more. If you do both of those things, it's even better. But there is no single weight-loss diet or activity that's better than any other. When it comes to weight loss, the most effective plan is the one that you'll stick with.  Improve your diet -- There is no single diet that is right for everyone. But in general, a healthy diet can include:  Lots of fruits, vegetables, and whole grains  Some beans, peas, lentils, chickpeas, and similar foods  Some nuts, such as walnuts, almonds, and peanuts  Fat-free or low-fat milk and milk products  Some fish  To have a healthy diet, it's also important to limit or avoid sugar, sweets, meats, and refined grains. (Refined grains are found in white bread, white rice, most forms of pasta, and most packaged "snack" foods.)  Reduce salt -- Many people think that eating a low-sodium diet means avoiding the salt shaker and not adding salt when cooking. The truth is, not adding salt at the table or when you cook will only help a little. Almost all of the sodium you eat is already in the food you buy at the grocery store or at restaurants (figure 1).  The most important thing you can do to cut down on sodium is to eat less processed food. That means that you should avoid most foods that are sold in cans, boxes, jars, and bags. You should also eat in restaurants less often.  To reduce the amount of sodium you get, buy fresh or fresh-frozen fruits, vegetables, and meats. (Fresh-frozen foods have had nothing added to them " "before freezing.) Then you can make meals at home, from scratch, with these ingredients.  As with the other changes, don't try to cut out salt all at once. Instead, choose 1 or 2 foods that have a lot of sodium and try to replace them with low-sodium choices. When you get used to those low-sodium options, find another food or 2 to change. Then keep going, until all the foods you eat are sodium-free or low in sodium.  Become more active -- If you want to be more active, you don't have to go to the gym or get all sweaty. It is possible to increase your activity level while doing everyday things you enjoy. Walking, gardening, and dancing are just a few of the things that you might try. As with all the other changes, the key is not to do too much too fast. If you don't do any activity now, start by walking for just a few minutes every other day. Do that for a few weeks. If you stick with it, try doing it for longer. But if you find that you don't like walking, try a different activity.  Drink less alcohol -- If you are a woman, do not have more than 1 "standard drink" of alcohol a day. If you are a man, do not have more than 2. A "standard drink" is:  A can or bottle that has 12 ounces of beer  A glass that has 5 ounces of wine  A shot that has 1.5 ounces of whiskey  Where should I start? -- If you want to improve your lifestyle, start by making the changes that you think would be easiest for you. If you used to exercise and just got out of the habit, maybe it would be easy for you to start exercising again. Or if you actually like cooking meals from scratch, maybe the first thing you should focus on is eating home-cooked meals that are low in sodium.  Whatever you tackle first, choose specific, realistic goals, and give yourself a deadline. For example, do not decide that you are going to "exercise more." Instead, decide that you are going to walk for 10 minutes on Monday, Wednesday, and Friday, and that you are going " to do this for the next 2 weeks.  When lifestyle changes are too general, people have a hard time following through.  Now go. You can do it!  All topics are updated as new evidence becomes available and our peer review process is complete.  This topic retrieved from [employer] on: Sep 21, 2021.  Topic 58493 Version 8.0  Release: 29.4.2 - C29.263  © 2021 UpToDate, Inc. and/or its affiliates. All rights reserved.  figure 1: Sources of sodium in your diet     Graphic 22295 Version 2.0    Consumer Information Use and Disclaimer   This information is not specific medical advice and does not replace information you receive from your health care provider. This is only a brief summary of general information. It does NOT include all information about conditions, illnesses, injuries, tests, procedures, treatments, therapies, discharge instructions or life-style choices that may apply to you. You must talk with your health care provider for complete information about your health and treatment options. This information should not be used to decide whether or not to accept your health care provider's advice, instructions or recommendations. Only your health care provider has the knowledge and training to provide advice that is right for you. The use of this information is governed by the [employer] End User License Agreement, available at https://www.Allclasses."[employer]"/en/solutions/[employer]/about/zaina.The use of [employer] content is governed by the [employer] Terms of Use. ©2021 UpToDate, Inc. All rights reserved.  Copyright   © 2021 UpToDate, Inc. and/or its affiliates. All rights reserved.           Signature: Dorys Serrano DNP, NILESP-C

## 2023-07-05 NOTE — PATIENT INSTRUCTIONS
"Patient Education       Controlling Your Blood Pressure Through Lifestyle   The Basics   Written by the doctors and editors at [facility]   What does my lifestyle have to do with my blood pressure? -- The things you do and the foods you eat have a big effect on your blood pressure and your overall health. Following the right lifestyle can:  Lower your blood pressure or keep you from getting high blood pressure in the first place  Reduce your need for blood pressure medicines  Make medicines for high blood pressure work better, if you do take them  Lower the chances that you'll have a heart attack or stroke, or develop kidney disease  Which lifestyle choices will help lower my blood pressure? -- Here's what you can do:  Lose weight (if you are overweight)  Choose a diet rich in fruits, vegetables, and low-fat dairy products, and low in meats, sweets, and refined grains  Eat less salt (sodium)  Do something active for at least 30 minutes a day on most days of the week  Limit the amount of alcohol you drink  If you have high blood pressure, it's also very important to quit smoking (if you smoke). Quitting smoking might not bring your blood pressure down. But it will lower the chances that you'll have a heart attack or stroke, and it will help you feel better and live longer.  Start low and go slow -- The changes listed above might sound like a lot, but don't worry. You don't have to change everything all at once. The key to improving your lifestyle is to "start low and go slow." Choose 1 small, specific thing to change and try doing it for a while. If it works for you, keep doing it until it becomes a habit. If it doesn't, don't give up. Choose something else to change and see how that goes.  Let's say, for example, that you would like to improve your diet. If you're the type of person who eats cheeseburgers and French fries all the time, you can't switch to eating just salads from one day to the next. When people try to " "make changes like that, they often fail. Then they feel frustrated and tend to give up. So instead of trying to change everything about your diet in 1 day, change 1 or 2 small things about your diet and give yourself time to get used to those changes. For instance, keep the cheeseburger but give up the French fries. Or eat the same things but cut your portions in half.  As you find things that you are able to change and stick with, keep adding new changes. In time, you will see that you can actually change a lot. You just have to get used to the changes slowly.  Lose weight -- When people think about losing weight, they sometimes make it more complicated than it really is. To lose weight, you have to either eat less or move more. If you do both of those things, it's even better. But there is no single weight-loss diet or activity that's better than any other. When it comes to weight loss, the most effective plan is the one that you'll stick with.  Improve your diet -- There is no single diet that is right for everyone. But in general, a healthy diet can include:  Lots of fruits, vegetables, and whole grains  Some beans, peas, lentils, chickpeas, and similar foods  Some nuts, such as walnuts, almonds, and peanuts  Fat-free or low-fat milk and milk products  Some fish  To have a healthy diet, it's also important to limit or avoid sugar, sweets, meats, and refined grains. (Refined grains are found in white bread, white rice, most forms of pasta, and most packaged "snack" foods.)  Reduce salt -- Many people think that eating a low-sodium diet means avoiding the salt shaker and not adding salt when cooking. The truth is, not adding salt at the table or when you cook will only help a little. Almost all of the sodium you eat is already in the food you buy at the grocery store or at restaurants (figure 1).  The most important thing you can do to cut down on sodium is to eat less processed food. That means that you should " "avoid most foods that are sold in cans, boxes, jars, and bags. You should also eat in restaurants less often.  To reduce the amount of sodium you get, buy fresh or fresh-frozen fruits, vegetables, and meats. (Fresh-frozen foods have had nothing added to them before freezing.) Then you can make meals at home, from scratch, with these ingredients.  As with the other changes, don't try to cut out salt all at once. Instead, choose 1 or 2 foods that have a lot of sodium and try to replace them with low-sodium choices. When you get used to those low-sodium options, find another food or 2 to change. Then keep going, until all the foods you eat are sodium-free or low in sodium.  Become more active -- If you want to be more active, you don't have to go to the gym or get all sweaty. It is possible to increase your activity level while doing everyday things you enjoy. Walking, gardening, and dancing are just a few of the things that you might try. As with all the other changes, the key is not to do too much too fast. If you don't do any activity now, start by walking for just a few minutes every other day. Do that for a few weeks. If you stick with it, try doing it for longer. But if you find that you don't like walking, try a different activity.  Drink less alcohol -- If you are a woman, do not have more than 1 "standard drink" of alcohol a day. If you are a man, do not have more than 2. A "standard drink" is:  A can or bottle that has 12 ounces of beer  A glass that has 5 ounces of wine  A shot that has 1.5 ounces of whiskey  Where should I start? -- If you want to improve your lifestyle, start by making the changes that you think would be easiest for you. If you used to exercise and just got out of the habit, maybe it would be easy for you to start exercising again. Or if you actually like cooking meals from scratch, maybe the first thing you should focus on is eating home-cooked meals that are low in sodium.  Whatever you " "tackle first, choose specific, realistic goals, and give yourself a deadline. For example, do not decide that you are going to "exercise more." Instead, decide that you are going to walk for 10 minutes on Monday, Wednesday, and Friday, and that you are going to do this for the next 2 weeks.  When lifestyle changes are too general, people have a hard time following through.  Now go. You can do it!  All topics are updated as new evidence becomes available and our peer review process is complete.  This topic retrieved from [employer] on: Sep 21, 2021.  Topic 86431 Version 8.0  Release: 29.4.2 - C29.263  © 2021 UpToDate, Inc. and/or its affiliates. All rights reserved.  figure 1: Sources of sodium in your diet     Graphic 71190 Version 2.0    Consumer Information Use and Disclaimer   This information is not specific medical advice and does not replace information you receive from your health care provider. This is only a brief summary of general information. It does NOT include all information about conditions, illnesses, injuries, tests, procedures, treatments, therapies, discharge instructions or life-style choices that may apply to you. You must talk with your health care provider for complete information about your health and treatment options. This information should not be used to decide whether or not to accept your health care provider's advice, instructions or recommendations. Only your health care provider has the knowledge and training to provide advice that is right for you. The use of this information is governed by the [employer] End User License Agreement, available at https://www.[employer]Connect.[employer]/en/solutions/[employer]/about/zaina.The use of [employer] content is governed by the [employer] Terms of Use. ©2021 UpToDate, Inc. All rights reserved.  Copyright   © 2021 UpToDate, Inc. and/or its affiliates. All rights reserved.    "

## 2023-07-18 ENCOUNTER — EXTERNAL HOSPITAL ADMISSION (OUTPATIENT)
Dept: ADMINISTRATIVE | Facility: CLINIC | Age: 83
End: 2023-07-18

## 2023-07-18 ENCOUNTER — OFFICE VISIT (OUTPATIENT)
Dept: PRIMARY CARE CLINIC | Facility: CLINIC | Age: 83
End: 2023-07-18
Payer: MEDICARE

## 2023-07-18 ENCOUNTER — PATIENT OUTREACH (OUTPATIENT)
Dept: ADMINISTRATIVE | Facility: CLINIC | Age: 83
End: 2023-07-18

## 2023-07-18 VITALS
WEIGHT: 205 LBS | BODY MASS INDEX: 26.31 KG/M2 | HEIGHT: 74 IN | SYSTOLIC BLOOD PRESSURE: 138 MMHG | OXYGEN SATURATION: 99 % | TEMPERATURE: 98 F | DIASTOLIC BLOOD PRESSURE: 80 MMHG | HEART RATE: 72 BPM | RESPIRATION RATE: 18 BRPM

## 2023-07-18 DIAGNOSIS — M05.79 RHEUMATOID ARTHRITIS INVOLVING MULTIPLE SITES WITH POSITIVE RHEUMATOID FACTOR: ICD-10-CM

## 2023-07-18 DIAGNOSIS — G89.4 CHRONIC PAIN SYNDROME: ICD-10-CM

## 2023-07-18 DIAGNOSIS — I25.10 CORONARY ARTERY DISEASE INVOLVING NATIVE CORONARY ARTERY OF NATIVE HEART, UNSPECIFIED WHETHER ANGINA PRESENT: Primary | ICD-10-CM

## 2023-07-18 PROBLEM — I21.4 NON-STEMI (NON-ST ELEVATED MYOCARDIAL INFARCTION): Status: ACTIVE | Noted: 2023-07-18

## 2023-07-18 PROBLEM — T78.3XXA ANGIOEDEMA OF LIPS: Status: ACTIVE | Noted: 2023-07-18

## 2023-07-18 PROCEDURE — 99212 OFFICE O/P EST SF 10 MIN: CPT | Mod: ,,, | Performed by: FAMILY MEDICINE

## 2023-07-18 PROCEDURE — 99212 PR OFFICE/OUTPT VISIT, EST, LEVL II, 10-19 MIN: ICD-10-PCS | Mod: ,,, | Performed by: FAMILY MEDICINE

## 2023-07-18 RX ORDER — HYDROCODONE BITARTRATE AND ACETAMINOPHEN 5; 325 MG/1; MG/1
1 TABLET ORAL EVERY 4 HOURS PRN
COMMUNITY
Start: 2023-07-14

## 2023-07-18 NOTE — PROGRESS NOTES
Subjective     Patient ID: Jan Perez is a 82 y.o. male.    Chief Complaint: Consult (Consult about rx. For norco 5 mg) and Follow-up (Follow up visit from [facility].)    Pt. Is wanting more norco 5mg for his generalized pain with arthritis and abdominal issues.    Follow-up  Associated symptoms include arthralgias. Pertinent negatives include no chest pain, coughing, fatigue, fever, headaches, myalgias, nausea or vomiting.   Review of Systems   Constitutional:  Negative for fatigue and fever.   HENT:  Negative for dental problem.    Eyes:  Negative for discharge.   Respiratory:  Negative for cough, choking, chest tightness and shortness of breath.    Cardiovascular:  Negative for chest pain and leg swelling.   Gastrointestinal:  Negative for constipation, diarrhea, nausea and vomiting.   Genitourinary:  Negative for discharge and flank pain.   Musculoskeletal:  Positive for arthralgias. Negative for myalgias.   Allergic/Immunologic: Negative for environmental allergies.   Neurological:  Negative for headaches and memory loss.   Psychiatric/Behavioral:  Negative for behavioral problems and hallucinations.         Objective     Physical Exam  Vitals and nursing note reviewed.   Constitutional:       Appearance: Normal appearance. He is normal weight.   HENT:      Head: Normocephalic and atraumatic.      Right Ear: Tympanic membrane normal.      Left Ear: Tympanic membrane normal.      Nose: Nose normal.      Mouth/Throat:      Mouth: Mucous membranes are moist.   Eyes:      Extraocular Movements: Extraocular movements intact.      Conjunctiva/sclera: Conjunctivae normal.      Pupils: Pupils are equal, round, and reactive to light.   Cardiovascular:      Rate and Rhythm: Normal rate and regular rhythm.      Pulses: Normal pulses.   Pulmonary:      Effort: Pulmonary effort is normal.      Breath sounds: Normal breath sounds.   Abdominal:      General: Abdomen is flat. Bowel sounds are normal.       Palpations: Abdomen is soft.   Musculoskeletal:         General: Normal range of motion.      Cervical back: Normal range of motion and neck supple.   Skin:     General: Skin is warm and dry.   Neurological:      General: No focal deficit present.      Mental Status: He is alert and oriented to person, place, and time.   Psychiatric:         Mood and Affect: Mood normal.          Assessment and Plan     1. Coronary artery disease involving native coronary artery of native heart, unspecified whether angina present    2. Rheumatoid arthritis involving multiple sites with positive rheumatoid factor  -     Ambulatory referral/consult to Pain Clinic; Future; Expected date: 07/25/2023    3. Chronic pain syndrome  -     Ambulatory referral/consult to Pain Clinic; Future; Expected date: 07/25/2023        Pt. Wants to continue norco use  Referral made  RTC as needed         No follow-ups on file.

## 2023-07-18 NOTE — PROGRESS NOTES
Patient is not eligible for post discharge call due to being admitted to observation/outpatient services.

## 2023-08-28 ENCOUNTER — OFFICE VISIT (OUTPATIENT)
Dept: PRIMARY CARE CLINIC | Facility: CLINIC | Age: 83
End: 2023-08-28
Payer: MEDICARE

## 2023-08-28 VITALS
DIASTOLIC BLOOD PRESSURE: 80 MMHG | RESPIRATION RATE: 20 BRPM | SYSTOLIC BLOOD PRESSURE: 138 MMHG | WEIGHT: 202 LBS | OXYGEN SATURATION: 98 % | HEIGHT: 74 IN | BODY MASS INDEX: 25.93 KG/M2 | HEART RATE: 84 BPM | TEMPERATURE: 97 F

## 2023-08-28 DIAGNOSIS — Z12.5 ENCOUNTER FOR SCREENING FOR MALIGNANT NEOPLASM OF PROSTATE: ICD-10-CM

## 2023-08-28 DIAGNOSIS — M19.90 ARTHRITIS: ICD-10-CM

## 2023-08-28 DIAGNOSIS — I25.10 CORONARY ARTERY DISEASE INVOLVING NATIVE CORONARY ARTERY OF NATIVE HEART, UNSPECIFIED WHETHER ANGINA PRESENT: ICD-10-CM

## 2023-08-28 DIAGNOSIS — I10 PRIMARY HYPERTENSION: Primary | ICD-10-CM

## 2023-08-28 LAB
ALBUMIN SERPL BCP-MCNC: 3.8 G/DL (ref 3.5–5)
ALBUMIN/GLOB SERPL: 1.1 {RATIO}
ALP SERPL-CCNC: 63 U/L (ref 45–115)
ALT SERPL W P-5'-P-CCNC: 23 U/L (ref 16–61)
ANION GAP SERPL CALCULATED.3IONS-SCNC: 7 MMOL/L (ref 7–16)
AST SERPL W P-5'-P-CCNC: 20 U/L (ref 15–37)
BASOPHILS # BLD AUTO: 0.02 K/UL (ref 0–0.2)
BASOPHILS NFR BLD AUTO: 0.3 % (ref 0–1)
BILIRUB SERPL-MCNC: 0.4 MG/DL (ref ?–1.2)
BUN SERPL-MCNC: 17 MG/DL (ref 7–18)
BUN/CREAT SERPL: 15 (ref 6–20)
CALCIUM SERPL-MCNC: 9.3 MG/DL (ref 8.5–10.1)
CHLORIDE SERPL-SCNC: 105 MMOL/L (ref 98–107)
CHOLEST SERPL-MCNC: 98 MG/DL (ref 0–200)
CHOLEST/HDLC SERPL: 2 {RATIO}
CO2 SERPL-SCNC: 32 MMOL/L (ref 21–32)
CREAT SERPL-MCNC: 1.15 MG/DL (ref 0.7–1.3)
DIFFERENTIAL METHOD BLD: ABNORMAL
EGFR (NO RACE VARIABLE) (RUSH/TITUS): 64 ML/MIN/1.73M2
EOSINOPHIL # BLD AUTO: 0.21 K/UL (ref 0–0.5)
EOSINOPHIL NFR BLD AUTO: 2.8 % (ref 1–4)
ERYTHROCYTE [DISTWIDTH] IN BLOOD BY AUTOMATED COUNT: 14.7 % (ref 11.5–14.5)
GLOBULIN SER-MCNC: 3.6 G/DL (ref 2–4)
GLUCOSE SERPL-MCNC: 127 MG/DL (ref 74–106)
HCT VFR BLD AUTO: 42.5 % (ref 40–54)
HDLC SERPL-MCNC: 49 MG/DL (ref 40–60)
HGB BLD-MCNC: 13.3 G/DL (ref 13.5–18)
IMM GRANULOCYTES # BLD AUTO: 0.02 K/UL (ref 0–0.04)
IMM GRANULOCYTES NFR BLD: 0.3 % (ref 0–0.4)
LDLC SERPL CALC-MCNC: 39 MG/DL
LYMPHOCYTES # BLD AUTO: 3.19 K/UL (ref 1–4.8)
LYMPHOCYTES NFR BLD AUTO: 42 % (ref 27–41)
MCH RBC QN AUTO: 27.7 PG (ref 27–31)
MCHC RBC AUTO-ENTMCNC: 31.3 G/DL (ref 32–36)
MCV RBC AUTO: 88.5 FL (ref 80–96)
MONOCYTES # BLD AUTO: 0.67 K/UL (ref 0–0.8)
MONOCYTES NFR BLD AUTO: 8.8 % (ref 2–6)
MPC BLD CALC-MCNC: 10.8 FL (ref 9.4–12.4)
NEUTROPHILS # BLD AUTO: 3.49 K/UL (ref 1.8–7.7)
NEUTROPHILS NFR BLD AUTO: 45.8 % (ref 53–65)
NONHDLC SERPL-MCNC: 49 MG/DL
NRBC # BLD AUTO: 0 X10E3/UL
NRBC, AUTO (.00): 0 %
PLATELET # BLD AUTO: 180 K/UL (ref 150–400)
POTASSIUM SERPL-SCNC: 3.8 MMOL/L (ref 3.5–5.1)
PROT SERPL-MCNC: 7.4 G/DL (ref 6.4–8.2)
RBC # BLD AUTO: 4.8 M/UL (ref 4.6–6.2)
SODIUM SERPL-SCNC: 140 MMOL/L (ref 136–145)
TRIGL SERPL-MCNC: 51 MG/DL (ref 35–150)
VLDLC SERPL-MCNC: 10 MG/DL
WBC # BLD AUTO: 7.6 K/UL (ref 4.5–11)

## 2023-08-28 PROCEDURE — 99213 PR OFFICE/OUTPT VISIT, EST, LEVL III, 20-29 MIN: ICD-10-PCS | Mod: ,,, | Performed by: FAMILY MEDICINE

## 2023-08-28 PROCEDURE — 85025 COMPLETE CBC W/AUTO DIFF WBC: CPT | Mod: ,,, | Performed by: CLINICAL MEDICAL LABORATORY

## 2023-08-28 PROCEDURE — 99213 OFFICE O/P EST LOW 20 MIN: CPT | Mod: ,,, | Performed by: FAMILY MEDICINE

## 2023-08-28 PROCEDURE — 80061 LIPID PANEL: ICD-10-PCS | Mod: ,,, | Performed by: CLINICAL MEDICAL LABORATORY

## 2023-08-28 PROCEDURE — 80053 COMPREHENSIVE METABOLIC PANEL: ICD-10-PCS | Mod: ,,, | Performed by: CLINICAL MEDICAL LABORATORY

## 2023-08-28 PROCEDURE — 80053 COMPREHEN METABOLIC PANEL: CPT | Mod: ,,, | Performed by: CLINICAL MEDICAL LABORATORY

## 2023-08-28 PROCEDURE — 80061 LIPID PANEL: CPT | Mod: ,,, | Performed by: CLINICAL MEDICAL LABORATORY

## 2023-08-28 PROCEDURE — 85025 CBC WITH DIFFERENTIAL: ICD-10-PCS | Mod: ,,, | Performed by: CLINICAL MEDICAL LABORATORY

## 2023-08-28 NOTE — PROGRESS NOTES
Subjective     Patient ID: Jan Perez is a 82 y.o. male.    Chief Complaint: Hypertension (Check up. Didn't bring medication with him today.)    Doing well today.    Hypertension  Pertinent negatives include no chest pain, headaches or shortness of breath.     Review of Systems   Constitutional:  Negative for fatigue and fever.   HENT:  Negative for dental problem.    Eyes:  Negative for discharge.   Respiratory:  Negative for cough, choking, chest tightness and shortness of breath.    Cardiovascular:  Negative for chest pain and leg swelling.   Gastrointestinal:  Negative for constipation, diarrhea, nausea and vomiting.   Genitourinary:  Negative for discharge and flank pain.   Musculoskeletal:  Negative for arthralgias and myalgias.   Allergic/Immunologic: Negative for environmental allergies.   Neurological:  Negative for headaches and memory loss.   Psychiatric/Behavioral:  Negative for behavioral problems and hallucinations.           Objective     Physical Exam  Vitals and nursing note reviewed.   Constitutional:       Appearance: Normal appearance. He is normal weight.   HENT:      Head: Normocephalic and atraumatic.      Right Ear: Tympanic membrane normal.      Left Ear: Tympanic membrane normal.      Nose: Nose normal.      Mouth/Throat:      Mouth: Mucous membranes are moist.   Eyes:      Extraocular Movements: Extraocular movements intact.      Conjunctiva/sclera: Conjunctivae normal.      Pupils: Pupils are equal, round, and reactive to light.   Cardiovascular:      Rate and Rhythm: Normal rate and regular rhythm.      Pulses: Normal pulses.   Pulmonary:      Effort: Pulmonary effort is normal.      Breath sounds: Normal breath sounds.   Abdominal:      General: Abdomen is flat. Bowel sounds are normal.      Palpations: Abdomen is soft.   Musculoskeletal:         General: Normal range of motion.      Cervical back: Normal range of motion and neck supple.   Skin:     General: Skin is warm and dry.    Neurological:      General: No focal deficit present.      Mental Status: He is alert and oriented to person, place, and time.   Psychiatric:         Mood and Affect: Mood normal.            Assessment and Plan     1. Primary hypertension    2. Coronary artery disease involving native coronary artery of native heart, unspecified whether angina present  -     CBC Auto Differential; Future; Expected date: 08/28/2023  -     Comprehensive Metabolic Panel; Future; Expected date: 08/28/2023  -     Lipid Panel; Future; Expected date: 08/28/2023    3. Arthritis    4. Encounter for screening for malignant neoplasm of prostate  -     PSA, Screening; Future; Expected date: 08/28/2023        RTC as needed  Will call lab results         No follow-ups on file.

## 2023-08-30 ENCOUNTER — TELEPHONE (OUTPATIENT)
Dept: PRIMARY CARE CLINIC | Facility: CLINIC | Age: 83
End: 2023-08-30
Payer: MEDICARE

## 2023-08-30 NOTE — TELEPHONE ENCOUNTER
----- Message from Mariaelena Marie MD sent at 8/29/2023  8:43 AM CDT -----  Please give pt. results

## 2023-10-23 PROBLEM — I21.4 NON-STEMI (NON-ST ELEVATED MYOCARDIAL INFARCTION): Status: RESOLVED | Noted: 2023-07-18 | Resolved: 2023-10-23

## 2023-12-09 DIAGNOSIS — Z71.89 COMPLEX CARE COORDINATION: ICD-10-CM

## 2024-04-09 ENCOUNTER — OFFICE VISIT (OUTPATIENT)
Dept: PRIMARY CARE CLINIC | Facility: CLINIC | Age: 84
End: 2024-04-09
Payer: MEDICARE

## 2024-04-09 VITALS
DIASTOLIC BLOOD PRESSURE: 68 MMHG | TEMPERATURE: 97 F | OXYGEN SATURATION: 99 % | WEIGHT: 200 LBS | BODY MASS INDEX: 25.67 KG/M2 | HEART RATE: 79 BPM | SYSTOLIC BLOOD PRESSURE: 130 MMHG | HEIGHT: 74 IN | RESPIRATION RATE: 18 BRPM

## 2024-04-09 DIAGNOSIS — C61 PROSTATE CANCER: ICD-10-CM

## 2024-04-09 DIAGNOSIS — M19.90 ARTHRITIS: ICD-10-CM

## 2024-04-09 DIAGNOSIS — Z12.5 ENCOUNTER FOR SCREENING FOR MALIGNANT NEOPLASM OF PROSTATE: ICD-10-CM

## 2024-04-09 DIAGNOSIS — E78.5 HYPERLIPIDEMIA, UNSPECIFIED HYPERLIPIDEMIA TYPE: ICD-10-CM

## 2024-04-09 DIAGNOSIS — I10 PRIMARY HYPERTENSION: Primary | ICD-10-CM

## 2024-04-09 LAB
ALBUMIN SERPL BCP-MCNC: 3.9 G/DL (ref 3.5–5)
ALBUMIN/GLOB SERPL: 1 {RATIO}
ALP SERPL-CCNC: 69 U/L (ref 45–115)
ALT SERPL W P-5'-P-CCNC: 23 U/L (ref 16–61)
ANION GAP SERPL CALCULATED.3IONS-SCNC: 7 MMOL/L (ref 7–16)
AST SERPL W P-5'-P-CCNC: 23 U/L (ref 15–37)
BASOPHILS # BLD AUTO: 0.02 K/UL (ref 0–0.2)
BASOPHILS NFR BLD AUTO: 0.3 % (ref 0–1)
BILIRUB SERPL-MCNC: 0.4 MG/DL (ref ?–1.2)
BUN SERPL-MCNC: 12 MG/DL (ref 7–18)
BUN/CREAT SERPL: 11 (ref 6–20)
CALCIUM SERPL-MCNC: 9.9 MG/DL (ref 8.5–10.1)
CHLORIDE SERPL-SCNC: 106 MMOL/L (ref 98–107)
CHOLEST SERPL-MCNC: 100 MG/DL (ref 0–200)
CHOLEST/HDLC SERPL: 2.2 {RATIO}
CO2 SERPL-SCNC: 31 MMOL/L (ref 21–32)
CREAT SERPL-MCNC: 1.12 MG/DL (ref 0.7–1.3)
DIFFERENTIAL METHOD BLD: ABNORMAL
EGFR (NO RACE VARIABLE) (RUSH/TITUS): 66 ML/MIN/1.73M2
EOSINOPHIL # BLD AUTO: 0.22 K/UL (ref 0–0.5)
EOSINOPHIL NFR BLD AUTO: 3 % (ref 1–4)
ERYTHROCYTE [DISTWIDTH] IN BLOOD BY AUTOMATED COUNT: 13.8 % (ref 11.5–14.5)
GLOBULIN SER-MCNC: 4.1 G/DL (ref 2–4)
GLUCOSE SERPL-MCNC: 153 MG/DL (ref 74–106)
HCT VFR BLD AUTO: 42.8 % (ref 40–54)
HDLC SERPL-MCNC: 45 MG/DL (ref 40–60)
HGB BLD-MCNC: 12.9 G/DL (ref 13.5–18)
IMM GRANULOCYTES # BLD AUTO: 0.01 K/UL (ref 0–0.04)
IMM GRANULOCYTES NFR BLD: 0.1 % (ref 0–0.4)
LDLC SERPL CALC-MCNC: 42 MG/DL
LYMPHOCYTES # BLD AUTO: 2.35 K/UL (ref 1–4.8)
LYMPHOCYTES NFR BLD AUTO: 32 % (ref 27–41)
MCH RBC QN AUTO: 27 PG (ref 27–31)
MCHC RBC AUTO-ENTMCNC: 30.1 G/DL (ref 32–36)
MCV RBC AUTO: 89.5 FL (ref 80–96)
MONOCYTES # BLD AUTO: 0.67 K/UL (ref 0–0.8)
MONOCYTES NFR BLD AUTO: 9.1 % (ref 2–6)
MPC BLD CALC-MCNC: 10.8 FL (ref 9.4–12.4)
NEUTROPHILS # BLD AUTO: 4.08 K/UL (ref 1.8–7.7)
NEUTROPHILS NFR BLD AUTO: 55.5 % (ref 53–65)
NONHDLC SERPL-MCNC: 55 MG/DL
NRBC # BLD AUTO: 0 X10E3/UL
NRBC, AUTO (.00): 0 %
PLATELET # BLD AUTO: 184 K/UL (ref 150–400)
POTASSIUM SERPL-SCNC: 3.8 MMOL/L (ref 3.5–5.1)
PROT SERPL-MCNC: 8 G/DL (ref 6.4–8.2)
PSA SERPL-MCNC: 0.1 NG/ML
RBC # BLD AUTO: 4.78 M/UL (ref 4.6–6.2)
SODIUM SERPL-SCNC: 140 MMOL/L (ref 136–145)
TRIGL SERPL-MCNC: 63 MG/DL (ref 35–150)
VLDLC SERPL-MCNC: 13 MG/DL
WBC # BLD AUTO: 7.35 K/UL (ref 4.5–11)

## 2024-04-09 PROCEDURE — G0103 PSA SCREENING: HCPCS | Mod: ,,, | Performed by: CLINICAL MEDICAL LABORATORY

## 2024-04-09 PROCEDURE — 80061 LIPID PANEL: CPT | Mod: ,,, | Performed by: CLINICAL MEDICAL LABORATORY

## 2024-04-09 PROCEDURE — 99214 OFFICE O/P EST MOD 30 MIN: CPT | Mod: ,,, | Performed by: FAMILY MEDICINE

## 2024-04-09 PROCEDURE — 85025 COMPLETE CBC W/AUTO DIFF WBC: CPT | Mod: ,,, | Performed by: CLINICAL MEDICAL LABORATORY

## 2024-04-09 PROCEDURE — 80053 COMPREHEN METABOLIC PANEL: CPT | Mod: ,,, | Performed by: CLINICAL MEDICAL LABORATORY

## 2024-04-09 RX ORDER — GABAPENTIN 100 MG/1
100 CAPSULE ORAL 3 TIMES DAILY
Qty: 90 CAPSULE | Refills: 2 | Status: SHIPPED | OUTPATIENT
Start: 2024-04-09

## 2024-04-09 RX ORDER — TRAMADOL HYDROCHLORIDE 50 MG/1
50 TABLET ORAL EVERY 12 HOURS PRN
Qty: 60 TABLET | Refills: 2 | Status: SHIPPED | OUTPATIENT
Start: 2024-04-09

## 2024-04-09 RX ORDER — AMLODIPINE BESYLATE 5 MG/1
5 TABLET ORAL DAILY
COMMUNITY

## 2024-04-09 NOTE — PROGRESS NOTES
Subjective     Patient ID: Jan Perez is a 83 y.o. male.    Chief Complaint: Medication Refill    No complaints today.    Medication Refill  Associated symptoms include arthralgias. Pertinent negatives include no chest pain, coughing, fatigue, fever, headaches, myalgias, nausea or vomiting.     Review of Systems   Constitutional:  Negative for fatigue and fever.   HENT:  Negative for dental problem.    Eyes:  Negative for discharge.   Respiratory:  Negative for cough, choking, chest tightness and shortness of breath.    Cardiovascular:  Negative for chest pain and leg swelling.   Gastrointestinal:  Negative for constipation, diarrhea, nausea and vomiting.   Genitourinary:  Negative for discharge and flank pain.   Musculoskeletal:  Positive for arthralgias. Negative for myalgias.   Allergic/Immunologic: Negative for environmental allergies.   Neurological:  Negative for headaches and memory loss.   Psychiatric/Behavioral:  Negative for behavioral problems and hallucinations.           Objective     Physical Exam  Vitals and nursing note reviewed.   Constitutional:       Appearance: Normal appearance. He is normal weight.   HENT:      Head: Normocephalic and atraumatic.      Right Ear: Tympanic membrane normal.      Left Ear: Tympanic membrane normal.      Nose: Nose normal.      Mouth/Throat:      Mouth: Mucous membranes are moist.   Eyes:      Extraocular Movements: Extraocular movements intact.      Conjunctiva/sclera: Conjunctivae normal.      Pupils: Pupils are equal, round, and reactive to light.   Cardiovascular:      Rate and Rhythm: Normal rate and regular rhythm.      Pulses: Normal pulses.   Pulmonary:      Effort: Pulmonary effort is normal.      Breath sounds: Normal breath sounds.   Abdominal:      General: Abdomen is flat. Bowel sounds are normal.      Palpations: Abdomen is soft.   Musculoskeletal:         General: Normal range of motion.      Cervical back: Normal range of motion and neck supple.    Skin:     General: Skin is warm and dry.   Neurological:      General: No focal deficit present.      Mental Status: He is alert and oriented to person, place, and time.   Psychiatric:         Mood and Affect: Mood normal.            Assessment and Plan     1. Primary hypertension  -     CBC Auto Differential; Future; Expected date: 04/09/2024  -     Comprehensive Metabolic Panel; Future; Expected date: 04/09/2024    2. Hyperlipidemia, unspecified hyperlipidemia type  -     Lipid Panel; Future; Expected date: 04/09/2024    3. Encounter for screening for malignant neoplasm of prostate  -     PSA, Screening    4. Prostate cancer  -     gabapentin (NEURONTIN) 100 MG capsule; Take 1 capsule (100 mg total) by mouth 3 (three) times daily.  Dispense: 90 capsule; Refill: 2    5. Arthritis  -     traMADoL (ULTRAM) 50 mg tablet; Take 1 tablet (50 mg total) by mouth every 12 (twelve) hours as needed for Pain.  Dispense: 60 tablet; Refill: 2        RTC 3 months or before if needed         No follow-ups on file.

## 2024-04-12 ENCOUNTER — TELEPHONE (OUTPATIENT)
Dept: PRIMARY CARE CLINIC | Facility: CLINIC | Age: 84
End: 2024-04-12
Payer: MEDICARE

## 2024-04-12 NOTE — TELEPHONE ENCOUNTER
----- Message from Mariaelena Marie MD sent at 4/10/2024  8:26 AM CDT -----  Please give pt. results

## 2024-05-06 ENCOUNTER — TELEPHONE (OUTPATIENT)
Dept: PRIMARY CARE CLINIC | Facility: CLINIC | Age: 84
End: 2024-05-06
Payer: MEDICARE

## 2024-07-09 DIAGNOSIS — Z71.89 COMPLEX CARE COORDINATION: ICD-10-CM

## 2024-07-10 ENCOUNTER — OFFICE VISIT (OUTPATIENT)
Dept: PRIMARY CARE CLINIC | Facility: CLINIC | Age: 84
End: 2024-07-10
Payer: MEDICARE

## 2024-07-10 VITALS
RESPIRATION RATE: 18 BRPM | BODY MASS INDEX: 26.31 KG/M2 | OXYGEN SATURATION: 97 % | TEMPERATURE: 98 F | SYSTOLIC BLOOD PRESSURE: 170 MMHG | HEART RATE: 74 BPM | DIASTOLIC BLOOD PRESSURE: 68 MMHG | HEIGHT: 74 IN | WEIGHT: 205 LBS

## 2024-07-10 DIAGNOSIS — I25.118 CORONARY ARTERY DISEASE OF NATIVE ARTERY OF NATIVE HEART WITH STABLE ANGINA PECTORIS: ICD-10-CM

## 2024-07-10 DIAGNOSIS — M05.79 RHEUMATOID ARTHRITIS INVOLVING MULTIPLE SITES WITH POSITIVE RHEUMATOID FACTOR: ICD-10-CM

## 2024-07-10 DIAGNOSIS — I50.30 DIASTOLIC CONGESTIVE HEART FAILURE, UNSPECIFIED HF CHRONICITY: Primary | ICD-10-CM

## 2024-07-10 PROCEDURE — 99212 OFFICE O/P EST SF 10 MIN: CPT | Mod: ,,, | Performed by: FAMILY MEDICINE

## 2024-07-10 NOTE — PROGRESS NOTES
Subjective     Patient ID: Jan Perez is a 83 y.o. male.    Chief Complaint: Hypertension    checkup    Hypertension  Pertinent negatives include no chest pain, headaches or shortness of breath.     Review of Systems   Constitutional:  Negative for fatigue and fever.   HENT:  Negative for dental problem.    Eyes:  Negative for discharge.   Respiratory:  Negative for cough, choking, chest tightness and shortness of breath.    Cardiovascular:  Negative for chest pain and leg swelling.   Gastrointestinal:  Negative for constipation, diarrhea, nausea and vomiting.   Genitourinary:  Negative for discharge and flank pain.   Musculoskeletal:  Negative for arthralgias and myalgias.   Allergic/Immunologic: Negative for environmental allergies.   Neurological:  Negative for headaches and memory loss.   Psychiatric/Behavioral:  Negative for behavioral problems and hallucinations.           Objective     Physical Exam  Vitals and nursing note reviewed.   Constitutional:       Appearance: Normal appearance. He is normal weight.   HENT:      Head: Normocephalic and atraumatic.      Right Ear: Tympanic membrane normal.      Left Ear: Tympanic membrane normal.      Nose: Nose normal.      Mouth/Throat:      Mouth: Mucous membranes are moist.   Eyes:      Extraocular Movements: Extraocular movements intact.      Conjunctiva/sclera: Conjunctivae normal.      Pupils: Pupils are equal, round, and reactive to light.   Cardiovascular:      Rate and Rhythm: Normal rate and regular rhythm.      Pulses: Normal pulses.   Pulmonary:      Effort: Pulmonary effort is normal.      Breath sounds: Normal breath sounds.   Abdominal:      General: Abdomen is flat. Bowel sounds are normal.      Palpations: Abdomen is soft.   Musculoskeletal:         General: Normal range of motion.      Cervical back: Normal range of motion and neck supple.   Skin:     General: Skin is warm and dry.   Neurological:      General: No focal deficit present.       Mental Status: He is alert and oriented to person, place, and time.   Psychiatric:         Mood and Affect: Mood normal.            Assessment and Plan     1. Diastolic congestive heart failure, unspecified HF chronicity    2. Rheumatoid arthritis involving multiple sites with positive rheumatoid factor    3. Coronary artery disease of native artery of native heart with stable angina pectoris        Continue present meds  RTC as needed         No follow-ups on file.

## 2024-09-04 DIAGNOSIS — C61 PROSTATE CANCER: ICD-10-CM

## 2024-09-04 RX ORDER — GABAPENTIN 100 MG/1
100 CAPSULE ORAL 3 TIMES DAILY
Qty: 90 CAPSULE | Refills: 2 | Status: SHIPPED | OUTPATIENT
Start: 2024-09-04

## 2024-10-22 ENCOUNTER — OFFICE VISIT (OUTPATIENT)
Dept: PRIMARY CARE CLINIC | Facility: CLINIC | Age: 84
End: 2024-10-22
Payer: MEDICARE

## 2024-10-22 VITALS
HEIGHT: 74 IN | WEIGHT: 216 LBS | SYSTOLIC BLOOD PRESSURE: 138 MMHG | HEART RATE: 69 BPM | OXYGEN SATURATION: 98 % | TEMPERATURE: 98 F | BODY MASS INDEX: 27.72 KG/M2 | DIASTOLIC BLOOD PRESSURE: 72 MMHG | RESPIRATION RATE: 16 BRPM

## 2024-10-22 DIAGNOSIS — E78.5 HYPERLIPIDEMIA, UNSPECIFIED HYPERLIPIDEMIA TYPE: ICD-10-CM

## 2024-10-22 DIAGNOSIS — I25.10 CORONARY ARTERY DISEASE INVOLVING NATIVE CORONARY ARTERY OF NATIVE HEART, UNSPECIFIED WHETHER ANGINA PRESENT: Primary | ICD-10-CM

## 2024-10-22 DIAGNOSIS — M19.90 ARTHRITIS: ICD-10-CM

## 2024-10-22 DIAGNOSIS — C61 PROSTATE CANCER: ICD-10-CM

## 2024-10-22 DIAGNOSIS — Z23 FLU VACCINE NEED: ICD-10-CM

## 2024-10-22 DIAGNOSIS — I10 PRIMARY HYPERTENSION: ICD-10-CM

## 2024-10-22 LAB
ALBUMIN SERPL BCP-MCNC: 3.8 G/DL (ref 3.5–5)
ALBUMIN/GLOB SERPL: 0.8 {RATIO}
ALP SERPL-CCNC: 79 U/L (ref 45–115)
ALT SERPL W P-5'-P-CCNC: 45 U/L (ref 16–61)
ANION GAP SERPL CALCULATED.3IONS-SCNC: 8 MMOL/L (ref 7–16)
AST SERPL W P-5'-P-CCNC: 35 U/L (ref 15–37)
BASOPHILS # BLD AUTO: 0.02 K/UL (ref 0–0.2)
BASOPHILS NFR BLD AUTO: 0.2 % (ref 0–1)
BILIRUB SERPL-MCNC: 0.4 MG/DL (ref ?–1.2)
BUN SERPL-MCNC: 13 MG/DL (ref 7–18)
BUN/CREAT SERPL: 11 (ref 6–20)
CALCIUM SERPL-MCNC: 9.3 MG/DL (ref 8.5–10.1)
CHLORIDE SERPL-SCNC: 107 MMOL/L (ref 98–107)
CHOLEST SERPL-MCNC: 100 MG/DL (ref 0–200)
CHOLEST/HDLC SERPL: 2 {RATIO}
CO2 SERPL-SCNC: 28 MMOL/L (ref 21–32)
CREAT SERPL-MCNC: 1.19 MG/DL (ref 0.7–1.3)
DIFFERENTIAL METHOD BLD: ABNORMAL
EGFR (NO RACE VARIABLE) (RUSH/TITUS): 61 ML/MIN/1.73M2
EOSINOPHIL # BLD AUTO: 0.16 K/UL (ref 0–0.5)
EOSINOPHIL NFR BLD AUTO: 1.8 % (ref 1–4)
ERYTHROCYTE [DISTWIDTH] IN BLOOD BY AUTOMATED COUNT: 14.3 % (ref 11.5–14.5)
GLOBULIN SER-MCNC: 4.7 G/DL (ref 2–4)
GLUCOSE SERPL-MCNC: 139 MG/DL (ref 74–106)
HCT VFR BLD AUTO: 43.3 % (ref 40–54)
HDLC SERPL-MCNC: 51 MG/DL (ref 40–60)
HGB BLD-MCNC: 13.5 G/DL (ref 13.5–18)
IMM GRANULOCYTES # BLD AUTO: 0.05 K/UL (ref 0–0.04)
IMM GRANULOCYTES NFR BLD: 0.6 % (ref 0–0.4)
LDLC SERPL CALC-MCNC: 35 MG/DL
LYMPHOCYTES # BLD AUTO: 2.59 K/UL (ref 1–4.8)
LYMPHOCYTES NFR BLD AUTO: 29.2 % (ref 27–41)
MCH RBC QN AUTO: 27.6 PG (ref 27–31)
MCHC RBC AUTO-ENTMCNC: 31.2 G/DL (ref 32–36)
MCV RBC AUTO: 88.4 FL (ref 80–96)
MONOCYTES # BLD AUTO: 0.57 K/UL (ref 0–0.8)
MONOCYTES NFR BLD AUTO: 6.4 % (ref 2–6)
MPC BLD CALC-MCNC: 11.1 FL (ref 9.4–12.4)
NEUTROPHILS # BLD AUTO: 5.48 K/UL (ref 1.8–7.7)
NEUTROPHILS NFR BLD AUTO: 61.8 % (ref 53–65)
NONHDLC SERPL-MCNC: 49 MG/DL
NRBC # BLD AUTO: 0 X10E3/UL
NRBC, AUTO (.00): 0 %
PLATELET # BLD AUTO: 175 K/UL (ref 150–400)
POTASSIUM SERPL-SCNC: 3.8 MMOL/L (ref 3.5–5.1)
PROT SERPL-MCNC: 8.5 G/DL (ref 6.4–8.2)
RBC # BLD AUTO: 4.9 M/UL (ref 4.6–6.2)
SODIUM SERPL-SCNC: 139 MMOL/L (ref 136–145)
TRIGL SERPL-MCNC: 72 MG/DL (ref 35–150)
VLDLC SERPL-MCNC: 14 MG/DL
WBC # BLD AUTO: 8.87 K/UL (ref 4.5–11)

## 2024-10-22 PROCEDURE — 90656 IIV3 VACC NO PRSV 0.5 ML IM: CPT | Mod: ,,, | Performed by: FAMILY MEDICINE

## 2024-10-22 PROCEDURE — 80061 LIPID PANEL: CPT | Mod: ,,, | Performed by: CLINICAL MEDICAL LABORATORY

## 2024-10-22 PROCEDURE — G0008 ADMIN INFLUENZA VIRUS VAC: HCPCS | Mod: ,,, | Performed by: FAMILY MEDICINE

## 2024-10-22 PROCEDURE — 99214 OFFICE O/P EST MOD 30 MIN: CPT | Mod: ,,, | Performed by: FAMILY MEDICINE

## 2024-10-22 PROCEDURE — 80053 COMPREHEN METABOLIC PANEL: CPT | Mod: ,,, | Performed by: CLINICAL MEDICAL LABORATORY

## 2024-10-22 PROCEDURE — 85025 COMPLETE CBC W/AUTO DIFF WBC: CPT | Mod: ,,, | Performed by: CLINICAL MEDICAL LABORATORY

## 2024-10-22 RX ORDER — VALSARTAN 80 MG/1
TABLET ORAL
COMMUNITY

## 2024-10-22 RX ORDER — TRAMADOL HYDROCHLORIDE 50 MG/1
TABLET ORAL
COMMUNITY

## 2024-10-22 RX ORDER — AMLODIPINE BESYLATE 10 MG/1
10 TABLET ORAL
COMMUNITY
Start: 2024-10-01

## 2024-10-22 NOTE — PROGRESS NOTES
Subjective     Patient ID: Jan Perez is a 84 y.o. male.    Chief Complaint: Annual Exam, Hypertension, and Flu Vaccine    No complaints today.    Hypertension  Pertinent negatives include no chest pain, headaches or shortness of breath.     Review of Systems   Constitutional:  Negative for fatigue and fever.   HENT:  Negative for dental problem.    Eyes:  Negative for discharge.   Respiratory:  Negative for cough, choking, chest tightness and shortness of breath.    Cardiovascular:  Negative for chest pain and leg swelling.   Gastrointestinal:  Negative for constipation, diarrhea, nausea and vomiting.   Genitourinary:  Negative for discharge and flank pain.   Musculoskeletal:  Negative for arthralgias and myalgias.   Allergic/Immunologic: Negative for environmental allergies.   Neurological:  Negative for headaches and memory loss.   Psychiatric/Behavioral:  Negative for behavioral problems and hallucinations.           Objective     Physical Exam  Vitals and nursing note reviewed.   Constitutional:       Appearance: Normal appearance. He is normal weight.   HENT:      Head: Normocephalic and atraumatic.      Right Ear: Tympanic membrane normal.      Left Ear: Tympanic membrane normal.      Nose: Nose normal.      Mouth/Throat:      Mouth: Mucous membranes are moist.   Eyes:      Extraocular Movements: Extraocular movements intact.      Conjunctiva/sclera: Conjunctivae normal.      Pupils: Pupils are equal, round, and reactive to light.   Cardiovascular:      Rate and Rhythm: Normal rate and regular rhythm.      Pulses: Normal pulses.   Pulmonary:      Effort: Pulmonary effort is normal.      Breath sounds: Normal breath sounds.   Abdominal:      General: Abdomen is flat. Bowel sounds are normal.      Palpations: Abdomen is soft.   Musculoskeletal:         General: Normal range of motion.      Cervical back: Normal range of motion and neck supple.   Skin:     General: Skin is warm and dry.   Neurological:       General: No focal deficit present.      Mental Status: He is alert and oriented to person, place, and time.   Psychiatric:         Mood and Affect: Mood normal.            Assessment and Plan     1. Coronary artery disease involving native coronary artery of native heart, unspecified whether angina present    2. Arthritis    3. Primary hypertension    4. Prostate cancer        Flu shot given  Continue meds  RTC as needed         No follow-ups on file.

## 2024-10-24 ENCOUNTER — TELEPHONE (OUTPATIENT)
Dept: PRIMARY CARE CLINIC | Facility: CLINIC | Age: 84
End: 2024-10-24
Payer: MEDICARE

## 2024-10-24 NOTE — TELEPHONE ENCOUNTER
----- Message from Mariaelena Marie MD sent at 10/23/2024  8:37 AM CDT -----  Please give pt. results

## 2024-11-07 DIAGNOSIS — C61 PROSTATE CANCER: ICD-10-CM

## 2024-11-07 DIAGNOSIS — M19.90 ARTHRITIS: Primary | ICD-10-CM

## 2024-11-07 RX ORDER — GABAPENTIN 100 MG/1
100 CAPSULE ORAL 3 TIMES DAILY
Qty: 90 CAPSULE | Refills: 2 | Status: SHIPPED | OUTPATIENT
Start: 2024-11-07

## 2024-11-21 ENCOUNTER — EXTERNAL HOSPITAL ADMISSION (OUTPATIENT)
Dept: ADMINISTRATIVE | Facility: CLINIC | Age: 84
End: 2024-11-21

## 2024-11-21 ENCOUNTER — PATIENT OUTREACH (OUTPATIENT)
Dept: ADMINISTRATIVE | Facility: CLINIC | Age: 84
End: 2024-11-21

## 2024-11-21 NOTE — PROGRESS NOTES
C3 nurse spoke with wife  for a [facility] post hospital discharge follow up call. The patient has a scheduled [facility] appointment with Mariaelena Marie MD  on 11/25/24 @ 1030.

## 2024-11-25 ENCOUNTER — OFFICE VISIT (OUTPATIENT)
Dept: PRIMARY CARE CLINIC | Facility: CLINIC | Age: 84
End: 2024-11-25
Payer: MEDICARE

## 2024-11-25 VITALS
RESPIRATION RATE: 16 BRPM | HEIGHT: 74 IN | HEART RATE: 77 BPM | TEMPERATURE: 98 F | DIASTOLIC BLOOD PRESSURE: 68 MMHG | SYSTOLIC BLOOD PRESSURE: 136 MMHG | OXYGEN SATURATION: 98 % | WEIGHT: 216 LBS | BODY MASS INDEX: 27.72 KG/M2

## 2024-11-25 DIAGNOSIS — K62.5 GASTROINTESTINAL HEMORRHAGE ASSOCIATED WITH ANORECTAL SOURCE: ICD-10-CM

## 2024-11-25 DIAGNOSIS — I25.10 CORONARY ARTERY DISEASE INVOLVING NATIVE CORONARY ARTERY OF NATIVE HEART, UNSPECIFIED WHETHER ANGINA PRESENT: Primary | ICD-10-CM

## 2024-11-25 DIAGNOSIS — M19.90 ARTHRITIS: ICD-10-CM

## 2024-11-25 PROBLEM — K62.89 PROCTALGIA: Status: ACTIVE | Noted: 2024-11-19

## 2024-11-25 PROBLEM — R13.14 PHARYNGOESOPHAGEAL DYSPHAGIA: Status: ACTIVE | Noted: 2024-11-19

## 2024-11-25 PROBLEM — K21.9 GERD WITHOUT ESOPHAGITIS: Status: ACTIVE | Noted: 2024-11-19

## 2024-11-25 PROBLEM — K92.2 ACUTE LOWER GI BLEEDING: Status: ACTIVE | Noted: 2024-11-19

## 2024-11-25 PROCEDURE — 99212 OFFICE O/P EST SF 10 MIN: CPT | Mod: ,,, | Performed by: FAMILY MEDICINE

## 2024-11-25 RX ORDER — PANTOPRAZOLE SODIUM 40 MG/1
40 TABLET, DELAYED RELEASE ORAL
COMMUNITY
Start: 2024-11-20 | End: 2024-12-20

## 2024-11-25 RX ORDER — NEBIVOLOL 5 MG/1
5 TABLET ORAL
COMMUNITY
Start: 2024-11-07 | End: 2024-11-25

## 2024-11-25 RX ORDER — NEBIVOLOL 10 MG/1
10 TABLET ORAL
COMMUNITY
Start: 2024-08-20 | End: 2024-11-25

## 2024-11-25 NOTE — PROGRESS NOTES
Subjective     Patient ID: Jan Perez is a 84 y.o. male.    Chief Complaint: Follow-up ([facility]- C/O lower abdominal pain)    Pt. Was in the hospital on the 17th. He went in with chest pain. He had a workup that did not find anything acute. While there he has a GI bleed. He was supposed to hold his plavix x 6 days, then be scoped by Dr. Fields. He has been holding the plavix since leaving the hospital.    Follow-up  Pertinent negatives include no arthralgias, chest pain, coughing, fatigue, fever, headaches, myalgias, nausea or vomiting.     Review of Systems   Constitutional:  Negative for fatigue and fever.   HENT:  Negative for dental problem.    Eyes:  Negative for discharge.   Respiratory:  Negative for cough, choking, chest tightness and shortness of breath.    Cardiovascular:  Negative for chest pain and leg swelling.   Gastrointestinal:  Negative for constipation, diarrhea, nausea and vomiting.   Genitourinary:  Negative for discharge and flank pain.   Musculoskeletal:  Negative for arthralgias and myalgias.   Allergic/Immunologic: Negative for environmental allergies.   Neurological:  Negative for headaches and memory loss.   Psychiatric/Behavioral:  Negative for behavioral problems and hallucinations.           Objective     Physical Exam  Vitals and nursing note reviewed.   Constitutional:       Appearance: Normal appearance. He is normal weight.   HENT:      Head: Normocephalic and atraumatic.      Right Ear: Tympanic membrane normal.      Left Ear: Tympanic membrane normal.      Nose: Nose normal.      Mouth/Throat:      Mouth: Mucous membranes are moist.   Eyes:      Extraocular Movements: Extraocular movements intact.      Conjunctiva/sclera: Conjunctivae normal.      Pupils: Pupils are equal, round, and reactive to light.   Cardiovascular:      Rate and Rhythm: Normal rate and regular rhythm.      Pulses: Normal pulses.   Pulmonary:      Effort: Pulmonary effort is normal.      Breath sounds: Normal  breath sounds.   Abdominal:      General: Abdomen is flat. Bowel sounds are normal.      Palpations: Abdomen is soft.   Musculoskeletal:         General: Normal range of motion.      Cervical back: Normal range of motion and neck supple.   Skin:     General: Skin is warm and dry.   Neurological:      General: No focal deficit present.      Mental Status: He is alert and oriented to person, place, and time.   Psychiatric:         Mood and Affect: Mood normal.            Assessment and Plan     1. Coronary artery disease involving native coronary artery of native heart, unspecified whether angina present    2. Arthritis    3. Gastrointestinal hemorrhage associated with anorectal source        Called Dr. Ansari's office - they do not have pt. Down to be seen. They are checking and are going to call us back.  Pt. To continue holding plavix for now.  Will call patient later today.         No follow-ups on file.

## 2024-12-13 ENCOUNTER — TELEPHONE (OUTPATIENT)
Dept: PRIMARY CARE CLINIC | Facility: CLINIC | Age: 84
End: 2024-12-13
Payer: MEDICARE

## 2025-02-03 ENCOUNTER — TELEPHONE (OUTPATIENT)
Dept: VASCULAR SURGERY | Facility: CLINIC | Age: 85
End: 2025-02-03
Payer: MEDICARE

## 2025-03-12 ENCOUNTER — INITIAL CONSULT (OUTPATIENT)
Dept: VASCULAR SURGERY | Facility: CLINIC | Age: 85
End: 2025-03-12
Payer: MEDICARE

## 2025-03-12 VITALS
SYSTOLIC BLOOD PRESSURE: 167 MMHG | DIASTOLIC BLOOD PRESSURE: 91 MMHG | HEART RATE: 78 BPM | OXYGEN SATURATION: 97 % | HEIGHT: 74 IN | WEIGHT: 215 LBS | BODY MASS INDEX: 27.59 KG/M2

## 2025-03-12 DIAGNOSIS — H53.9 CHANGE IN VISION: ICD-10-CM

## 2025-03-12 DIAGNOSIS — I65.21 RIGHT-SIDED EXTRACRANIAL CAROTID ARTERY STENOSIS: ICD-10-CM

## 2025-03-12 DIAGNOSIS — I65.22 LEFT CAROTID ARTERY OCCLUSION: Primary | ICD-10-CM

## 2025-03-12 PROCEDURE — 99999 PR PBB SHADOW E&M-EST. PATIENT-LVL IV: CPT | Mod: PBBFAC,,, | Performed by: SURGERY

## 2025-03-12 PROCEDURE — 99203 OFFICE O/P NEW LOW 30 MIN: CPT | Mod: S$PBB,,, | Performed by: SURGERY

## 2025-03-12 PROCEDURE — 99214 OFFICE O/P EST MOD 30 MIN: CPT | Mod: PBBFAC | Performed by: SURGERY

## 2025-03-12 NOTE — PROGRESS NOTES
"Subjective:       Patient ID: Jan Perez is a 84 y.o. male.    Chief Complaint: other (LEFT ICA OCCLUSION AND RIGHT 60 TO 79% STENOSIS WITH RECENT LEFT HAND WEAKNESS LASTING OVER A WEEK , DR DILLARD)  Patient was left ICA occlusion no known stroke.  Admits to some frontal sinus are using maxillary sinus discomfort occasionally at night.  He is neurologically intact his right ICA is approximate 40% stenosed he has not smoking he has taken aspirin daily recommend yearly duplex no acute intervention at this time.  I have personally reviewed his CT angio of his carotids obtained from an outside institution, and formulated monitored opinion  family history includes Cancer in his mother.  Past Medical History:   Diagnosis Date    Hypertension       History reviewed. No pertinent surgical history.    reports that he has quit smoking. He has never used smokeless tobacco. He reports that he does not currently use alcohol. He reports that he does not use drugs.   HPI  Review of Systems      Objective:      BP (!) 167/91   Pulse 78   Ht 6' 2" (1.88 m)   Wt 97.5 kg (215 lb)   SpO2 97%   BMI 27.60 kg/m²    Physical Exam  Constitutional:       Appearance: Normal appearance.   HENT:      Head: Normocephalic and atraumatic.   Cardiovascular:      Rate and Rhythm: Normal rate.   Skin:     General: Skin is warm.      Capillary Refill: Capillary refill takes less than 2 seconds.   Neurological:      General: No focal deficit present.      Mental Status: He is alert.   Psychiatric:         Mood and Affect: Mood normal.         Behavior: Behavior normal.         Thought Content: Thought content normal.         Judgment: Judgment normal.           Assessment:       1. Left carotid artery occlusion    2. Right-sided extracranial carotid artery stenosis    3. Change in vision        Plan:       Carotid duplex 1 year continue aspirin, ophthalmology referral per request      "

## 2025-03-13 DIAGNOSIS — M19.90 ARTHRITIS: ICD-10-CM

## 2025-03-17 DIAGNOSIS — M19.90 ARTHRITIS: ICD-10-CM

## 2025-03-17 RX ORDER — GABAPENTIN 100 MG/1
100 CAPSULE ORAL 3 TIMES DAILY
Qty: 90 CAPSULE | Refills: 2 | Status: SHIPPED | OUTPATIENT
Start: 2025-03-17 | End: 2025-03-17 | Stop reason: SDUPTHER

## 2025-03-18 RX ORDER — GABAPENTIN 100 MG/1
100 CAPSULE ORAL 3 TIMES DAILY
Qty: 90 CAPSULE | Refills: 2 | Status: SHIPPED | OUTPATIENT
Start: 2025-03-18